# Patient Record
Sex: FEMALE | Race: OTHER | Employment: UNEMPLOYED | URBAN - METROPOLITAN AREA
[De-identification: names, ages, dates, MRNs, and addresses within clinical notes are randomized per-mention and may not be internally consistent; named-entity substitution may affect disease eponyms.]

---
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
## 2019-01-01 ENCOUNTER — HOSPITAL ENCOUNTER (EMERGENCY)
Age: 19
Discharge: HOME | End: 2019-01-01
Attending: EMERGENCY MEDICINE
Payer: COMMERCIAL

## 2019-01-01 VITALS
TEMPERATURE: 99 F | RESPIRATION RATE: 16 BRPM | HEART RATE: 100 BPM | WEIGHT: 170 LBS | OXYGEN SATURATION: 100 % | HEIGHT: 61 IN | DIASTOLIC BLOOD PRESSURE: 71 MMHG | SYSTOLIC BLOOD PRESSURE: 118 MMHG | BODY MASS INDEX: 32.1 KG/M2

## 2019-01-01 DIAGNOSIS — J02.9 SORE THROAT: Primary | ICD-10-CM

## 2019-01-01 LAB — DEPRECATED S PYO AG THROAT QL EIA: NEGATIVE

## 2019-01-01 PROCEDURE — 87081 CULTURE SCREEN ONLY: CPT

## 2019-01-01 PROCEDURE — 99283 EMERGENCY DEPT VISIT LOW MDM: CPT | Performed by: PHYSICIAN ASSISTANT

## 2019-01-01 PROCEDURE — 87880 STREP A ASSAY W/OPTIC: CPT

## 2019-01-01 RX ORDER — CEPHALEXIN 500 MG/1
500 CAPSULE ORAL 4 TIMES DAILY
Qty: 28 CAP | Refills: 0 | Status: SHIPPED | OUTPATIENT
Start: 2019-01-01 | End: 2019-01-08

## 2019-01-01 NOTE — ED NOTES
I have reviewed discharge instructions with the patient. The patient verbalized understanding. Patient left ED via Discharge Method: ambulatory to [facility] for questions and clarification provided. Patient given 1 scripts. To continue your aftercare when you leave the hospital, you may receive an automated call from our care team to check in on how you are doing. This is a free service and part of our promise to provide the best care and service to meet your aftercare needs.  If you have questions, or wish to unsubscribe from this service please call [phone]. Thank you for Choosing our [facility] Emergency Department.

## 2019-01-01 NOTE — ED PROVIDER NOTES
Pt 33 weeks pregnant sore throat started yesterday, also cough and congestion, no abd pain, bleeding or discharge The history is provided by the patient. Sore Throat This is a new problem. The current episode started yesterday. The problem has been rapidly worsening. Patient reports a subjective fever - was not measured. The fever has been present for 1 - 2 days. Associated symptoms include trouble swallowing and cough. She has had no exposure to strep or mono. She has tried nothing for the symptoms. The treatment provided no relief. Past Medical History:  
Diagnosis Date  Abscess  Acute bacterial conjunctivitis of both eyes  Acute sinusitis  Contact dermatitis  Dermatitis  Eczema  Exudative tonsillitis  Eye pain   
 right  Foul smelling urine  Gastroenteritis  Hordeolum external   
 Nausea and/or vomiting  Overeating  Overweight peds (BMI 85-94.9 percentile)  Pharyngitis  Rhinitis, allergic  Strep pharyngitis  Throat pain  URI (upper respiratory infection)  Vaginal discharge  Viral syndrome  Visual loss  Vitamin D deficiency History reviewed. No pertinent surgical history. Family History:  
Problem Relation Age of Onset  Hypertension Maternal Grandmother  Diabetes Maternal Grandfather Social History Socioeconomic History  Marital status: SINGLE Spouse name: Not on file  Number of children: Not on file  Years of education: Not on file  Highest education level: Not on file Social Needs  Financial resource strain: Not on file  Food insecurity - worry: Not on file  Food insecurity - inability: Not on file  Transportation needs - medical: Not on file  Transportation needs - non-medical: Not on file Occupational History  Not on file Tobacco Use  Smoking status: Never Smoker  Smokeless tobacco: Never Used Substance and Sexual Activity  Alcohol use: No  
 Drug use: No  
 Sexual activity: Not on file Other Topics Concern  Not on file Social History Narrative  Not on file ALLERGIES: Patient has no known allergies. Review of Systems HENT: Positive for sore throat and trouble swallowing. Respiratory: Positive for cough. All other systems reviewed and are negative. Vitals:  
 01/01/19 1145 BP: 116/76 Pulse: (!) 109 Resp: 16 Temp: 99.1 °F (37.3 °C) SpO2: 98% Weight: 77.1 kg (170 lb) Height: 5' 1\" (1.549 m) Physical Exam  
Constitutional: She is oriented to person, place, and time. She appears well-developed and well-nourished. No distress. HENT:  
Head: Normocephalic and atraumatic. Right Ear: Tympanic membrane normal.  
Left Ear: Tympanic membrane normal.  
Mouth/Throat: Tonsils are 1+ on the right. Tonsils are 1+ on the left. No tonsillar exudate. Nasal congestion Eyes: EOM are normal. Pupils are equal, round, and reactive to light. Neck: Normal range of motion. Neck supple. Cardiovascular: Normal rate, regular rhythm and normal heart sounds. Pulmonary/Chest: Effort normal and breath sounds normal. She has no wheezes. She has no rales. Abdominal: Soft. Bowel sounds are normal. There is no tenderness. Musculoskeletal: Normal range of motion. Neurological: She is alert and oriented to person, place, and time. Skin: Skin is warm. She is not diaphoretic. Psychiatric: She has a normal mood and affect. Nursing note and vitals reviewed. MDM Number of Diagnoses or Management Options Diagnosis management comments: rss -, will treat with keflex Amount and/or Complexity of Data Reviewed Clinical lab tests: ordered and reviewed Review and summarize past medical records: yes Risk of Complications, Morbidity, and/or Mortality Presenting problems: low Diagnostic procedures: low Management options: low Patient Progress Patient progress: improved Procedures

## 2019-01-04 LAB
BACTERIA SPEC CULT: NORMAL
SERVICE CMNT-IMP: NORMAL

## 2019-01-26 ENCOUNTER — HOSPITAL ENCOUNTER (OUTPATIENT)
Age: 19
Discharge: HOME | End: 2019-01-26
Attending: OBSTETRICS & GYNECOLOGY | Admitting: OBSTETRICS & GYNECOLOGY
Payer: COMMERCIAL

## 2019-01-26 VITALS
SYSTOLIC BLOOD PRESSURE: 109 MMHG | TEMPERATURE: 97.3 F | HEIGHT: 61 IN | BODY MASS INDEX: 32.12 KG/M2 | DIASTOLIC BLOOD PRESSURE: 70 MMHG | HEART RATE: 104 BPM

## 2019-01-26 PROBLEM — O36.8190 DECREASED FETAL MOVEMENT AFFECTING MANAGEMENT OF MOTHER, ANTEPARTUM: Status: ACTIVE | Noted: 2019-01-26

## 2019-01-26 PROCEDURE — 99282 EMERGENCY DEPT VISIT SF MDM: CPT

## 2019-01-26 PROCEDURE — 59025 FETAL NON-STRESS TEST: CPT

## 2019-01-26 PROCEDURE — 75810000275 HC EMERGENCY DEPT VISIT NO LEVEL OF CARE: Performed by: EMERGENCY MEDICINE

## 2019-01-26 NOTE — DISCHARGE INSTRUCTIONS
Counting Your Baby's Kicks: Care Instructions  Your Care Instructions    Counting your baby's kicks is one way your doctor can tell that your baby is healthy. Most women--especially in a first pregnancy--feel their baby move for the first time between 16 and 22 weeks. The movement may feel like flutters rather than kicks. Your baby may move more at certain times of the day. When you are active, you may notice less kicking than when you are resting. At your prenatal visits, your doctor will ask whether the baby is active. In your last trimester, your doctor may ask you to count the number of times you feel your baby move. Follow-up care is a key part of your treatment and safety. Be sure to make and go to all appointments, and call your doctor if you are having problems. It's also a good idea to know your test results and keep a list of the medicines you take. How do you count fetal kicks? · A common method of checking your baby's movement is to count the number of kicks or moves you feel in 1 hour. Ten movements (such as kicks, flutters, or rolls) in 1 hour are normal. Some doctors suggest that you count in the morning until you get to 10 movements. Then you can quit for that day and start again the next day. · Pick your baby's most active time of day to count. This may be any time from morning to evening. · If you do not feel 10 movements in an hour, your baby may be sleeping. Wait for the next hour and count again. When should you call for help? Call your doctor now or seek immediate medical care if:    · You noticed that your baby has stopped moving or is moving much less than normal.    Watch closely for changes in your health, and be sure to contact your doctor if you have any problems. Where can you learn more? Go to http://anh-praneeth.info/. Enter Y200 in the search box to learn more about \"Counting Your Baby's Kicks: Care Instructions. \"  Current as of: September 5, 2018  Content Version: 11.9  © 4599-1710 [email or web address]. Care instructions adapted under license by [employer] (which disclaims liability or warranty for this information). If you have questions about a medical condition or this instruction, always ask your healthcare professional. [address] any warranty or liability for your use of this information.

## 2019-01-26 NOTE — PROGRESS NOTES
Pt given discharge instructions- all questions answered and pt verbalizes understanding of instructions. Pt ambulated off unit in stable condition accompanied by FOB and mother.

## 2019-01-26 NOTE — ED PROVIDER NOTES
Chief Complaint: 
 
 
22 y.o. female at 37w1d 
weeks gestation who is seen for decreased fetal movement. Seen recently by MFM, was told baby was small, advised to do movement counts. Has not been moving well today. Care at [facility], not regularly seen. HISTORY: 
 
Social History Substance and Sexual Activity Sexual Activity Not on file Patient's last menstrual period was 03/04/2018. Social History Socioeconomic History  Marital status: SINGLE Spouse name: Not on file  Number of children: Not on file  Years of education: Not on file  Highest education level: Not on file Social Needs  Financial resource strain: Not on file  Food insecurity - worry: Not on file  Food insecurity - inability: Not on file  Transportation needs - medical: Not on file  Transportation needs - non-medical: Not on file Occupational History  Not on file Tobacco Use  Smoking status: Never Smoker  Smokeless tobacco: Never Used Substance and Sexual Activity  Alcohol use: No  
 Drug use: No  
 Sexual activity: Not on file Other Topics Concern [address] Service Not Asked  Blood Transfusions Not Asked  Caffeine Concern Not Asked  Occupational Exposure Not Asked [employer] Hazards Not Asked  Sleep Concern Not Asked  Stress Concern Not Asked  Weight Concern Not Asked  Special Diet Not Asked  Back Care Not Asked  Exercise Not Asked  Bike Helmet Not Asked  Seat Belt Not Asked  Self-Exams Not Asked Social History Narrative  Not on file Past Surgical History:  
Procedure Laterality Date  HX HERNIA REPAIR  2006 Past Medical History:  
Diagnosis Date  Abscess  Acute bacterial conjunctivitis of both eyes  Acute sinusitis  Contact dermatitis  Dermatitis  Eczema  Exudative tonsillitis  Eye pain   
 right  Foul smelling urine  Gastroenteritis  Gestational diabetes   
 failed 1 hr GTT  Hordeolum external   
 Nausea and/or vomiting  Overeating  Overweight peds (BMI 85-94.9 percentile)  Pharyngitis  Rhinitis, allergic  Strep pharyngitis  Throat pain  URI (upper respiratory infection)  Vaginal discharge  Viral syndrome  Visual loss  Vitamin D deficiency ROS: 
A 12 point review of symptoms negative except for chief complaint as described above. PHYSICAL EXAM: 
Blood pressure 109/70, pulse (!) 104, temperature 97.3 °F (36.3 °C), height 5' 1\" (1.549 m), last menstrual period 03/04/2018. Constitutional: The patient appears well, alert, oriented x 3. Cardiovascular: Heart RRR, no murmurs. Respiratory: Lungs clear, no respiratory distress GI: Abdomen soft, nontender, no guarding No fundal tenderness Musculoskeletal: no cva tenderness Upper ext: no edema, reflexes +2 Lower ext: no edema, neg kendall's, reflexes +2 Skin: no rashes or lesions Psychiatric:Mood/ Affect: appropriate Genitourinary: SVE: deferred FHT: 140's multiple accels, no decels, reactive NST. TOCO: no contractions I personally reviewed pt's medical record including relevant labs and ultrasounds Assessment/Plan: decreased fetal movement w reactive NST, stable for home, discussed movement counts. Understands indications for return, follow up in office as scheduled.

## 2019-01-26 NOTE — PROGRESS NOTES
Dr. Seble Cha notified of pt's arrival on unit and pt's complaint of decreased fetal movement. MD aware of category 1 FHT's. MD will come to bedside to evaluated shortly.

## 2019-05-20 PROBLEM — E66.01 SEVERE OBESITY (HCC): Status: ACTIVE | Noted: 2019-05-20

## 2021-09-10 PROBLEM — O36.8190 DECREASED FETAL MOVEMENT AFFECTING MANAGEMENT OF MOTHER, ANTEPARTUM: Status: RESOLVED | Noted: 2019-01-26 | Resolved: 2021-09-10

## 2021-09-10 PROBLEM — R10.9 RIGHT FLANK PAIN: Status: RESOLVED | Noted: 2017-12-01 | Resolved: 2021-09-10

## 2021-09-10 PROBLEM — Z34.90 PREGNANCY: Status: ACTIVE | Noted: 2021-09-10

## 2021-09-10 PROBLEM — Z86.32 HISTORY OF DIET-CONTROLLED GESTATIONAL DIABETES MELLITUS: Status: ACTIVE | Noted: 2021-09-10

## 2021-09-10 PROBLEM — Z09 ENCOUNTER FOR EXAMINATION FOLLOWING TREATMENT AT HOSPITAL: Status: RESOLVED | Noted: 2017-12-01 | Resolved: 2021-09-10

## 2021-09-10 PROBLEM — N39.0 URINARY TRACT INFECTION WITH HEMATURIA: Status: RESOLVED | Noted: 2017-12-01 | Resolved: 2021-09-10

## 2021-09-10 PROBLEM — E66.01 SEVERE OBESITY (HCC): Status: RESOLVED | Noted: 2019-05-20 | Resolved: 2021-09-10

## 2021-09-10 PROBLEM — R31.9 URINARY TRACT INFECTION WITH HEMATURIA: Status: RESOLVED | Noted: 2017-12-01 | Resolved: 2021-09-10

## 2021-09-10 PROBLEM — D72.829 LEUKOCYTOSIS: Status: RESOLVED | Noted: 2017-12-01 | Resolved: 2021-09-10

## 2021-09-21 ENCOUNTER — HOSPITAL ENCOUNTER (EMERGENCY)
Age: 21
Discharge: HOME | End: 2021-09-21
Attending: OBSTETRICS & GYNECOLOGY | Admitting: OBSTETRICS & GYNECOLOGY
Payer: COMMERCIAL

## 2021-09-21 VITALS
HEART RATE: 76 BPM | RESPIRATION RATE: 14 BRPM | TEMPERATURE: 98.5 F | OXYGEN SATURATION: 100 % | DIASTOLIC BLOOD PRESSURE: 58 MMHG | SYSTOLIC BLOOD PRESSURE: 99 MMHG

## 2021-09-21 PROBLEM — U07.1 COVID-19: Status: ACTIVE | Noted: 2021-09-21

## 2021-09-21 PROBLEM — O21.9 NAUSEA AND VOMITING IN PREGNANCY PRIOR TO 22 WEEKS GESTATION: Status: ACTIVE | Noted: 2021-09-21

## 2021-09-21 LAB
GLUCOSE, GLUUPC: NEGATIVE
KETONES UR-MCNC: NORMAL MG/DL
PROT UR QL: NORMAL

## 2021-09-21 PROCEDURE — 96374 THER/PROPH/DIAG INJ IV PUSH: CPT

## 2021-09-21 PROCEDURE — 74011250636 HC RX REV CODE- 250/636: Performed by: OBSTETRICS & GYNECOLOGY

## 2021-09-21 PROCEDURE — 96365 THER/PROPH/DIAG IV INF INIT: CPT

## 2021-09-21 PROCEDURE — 99285 EMERGENCY DEPT VISIT HI MDM: CPT

## 2021-09-21 PROCEDURE — 74011000258 HC RX REV CODE- 258: Performed by: OBSTETRICS & GYNECOLOGY

## 2021-09-21 PROCEDURE — 74011000250 HC RX REV CODE- 250: Performed by: OBSTETRICS & GYNECOLOGY

## 2021-09-21 PROCEDURE — 81002 URINALYSIS NONAUTO W/O SCOPE: CPT | Performed by: OBSTETRICS & GYNECOLOGY

## 2021-09-21 PROCEDURE — 74011000636 HC RX REV CODE- 636: Performed by: OBSTETRICS & GYNECOLOGY

## 2021-09-21 PROCEDURE — 96375 TX/PRO/DX INJ NEW DRUG ADDON: CPT

## 2021-09-21 RX ORDER — SODIUM CHLORIDE 9 MG/ML
125 INJECTION, SOLUTION INTRAVENOUS CONTINUOUS
Status: DISCONTINUED | OUTPATIENT
Start: 2021-09-21 | End: 2021-09-21 | Stop reason: HOSPADM

## 2021-09-21 RX ADMIN — FAMOTIDINE 20 MG: 10 INJECTION, SOLUTION INTRAVENOUS at 18:47

## 2021-09-21 RX ADMIN — SODIUM CHLORIDE 125 ML/HR: 900 INJECTION, SOLUTION INTRAVENOUS at 17:50

## 2021-09-21 RX ADMIN — CASIRIVIMAB AND IMDEVIMAB: 600; 600 INJECTION, SOLUTION, CONCENTRATE INTRAVENOUS at 19:05

## 2021-09-21 RX ADMIN — PROMETHAZINE HYDROCHLORIDE 12.5 MG: 25 INJECTION INTRAMUSCULAR; INTRAVENOUS at 18:50

## 2021-09-21 NOTE — PROGRESS NOTES
Dr Mady Collins at bs, consent given for treatment    2030 treatment/1 hr monitoring complete, pt states she feels better, d/c instructions given c pt verb understanding, left ambulatory to wait downstairs on her ride

## 2021-09-21 NOTE — H&P
[facility]INC[facility] History & Physical    Name: Elsa Cavanaugh MRN: [id number]  SSN: [id number]    YOB: 2000  Age: 24 y.o. Sex: female      Subjective:     Reason for Admission:  Pregnancy and COVID infection    History of Present Illness: Elsa Cavanaugh is a 24 y.o.   female with an estimated gestational age of 7w3d with Estimated Date of Delivery: 22. Patient complains of nausea and vomiting in the setting of COVID infection for 1 days. Pregnancy has been complicated by + COVID test on 21. Her sx started on 9/15. She and her  have both tested positive. She reports fever, cough, congestion, body aches/chills. Patient denies chest pain and shortness of breath. She denies any abdominal pain. She has noted N/V today 3-4 times. She has not been able to tolerate solid food today.     OB History    Para Term  AB Living   2 1 1     1   SAB TAB Ectopic Molar Multiple Live Births             1      # Outcome Date GA Lbr Ze/2nd Weight Sex Delivery Anes PTL Lv   2 Current            1 Term 19 38w4d  2.892 kg M Vag-Spont EPI  FORD     Past Medical History:   Diagnosis Date    Abscess     Acute bacterial conjunctivitis of both eyes     Acute sinusitis     Contact dermatitis     Dermatitis     Eczema     Exudative tonsillitis     Eye pain     right    Foul smelling urine     Gastroenteritis     Gestational diabetes     failed 1 hr GTT     Hordeolum external     Nausea and/or vomiting     Overeating     Overweight peds (BMI 85-94.9 percentile)     Pharyngitis     Rhinitis, allergic     Strep pharyngitis     Throat pain     URI (upper respiratory infection)     Vaginal discharge     Viral syndrome     Visual loss     Vitamin D deficiency      Past Surgical History:   Procedure Laterality Date    HX HERNIA REPAIR       Social History     Occupational History    Not on file   Tobacco Use    Smoking status: Never Smoker    Smokeless tobacco: Never Used   Substance and Sexual Activity    Alcohol use: No    Drug use: No    Sexual activity: Yes     Partners: Male     Birth control/protection: None     Family History   Problem Relation Age of Onset    Hypertension Maternal Grandmother     Diabetes Maternal Grandfather     Breast Cancer Paternal Grandmother        No Known Allergies  Prior to Admission medications    Medication Sig Start Date End Date Taking? Authorizing Provider   PNV66-Iron Fumarate-FA-DSS-DHA 26-1.2- mg cap Take  by mouth. Yes Provider, Historical   ondansetron hcl (ZOFRAN) 8 mg tablet Take 1 Tablet by mouth every eight (8) hours as needed for Nausea or Vomiting. Patient not taking: Reported on 2021 9/15/21   Micheline Tai NP        Review of Systems   Constitutional: Negative. HENT: Negative. Eyes: Negative. Respiratory: Negative. Cardiovascular: Negative. Gastrointestinal: negative  Genitourinary: Negative. Musculoskeletal: Negative. Skin: Negative. Neurological: Negative. Endo/Heme/Allergies: Negative. Psychiatric/Behavioral: Negative. Objective:     Vitals:    Vitals:    21 1848 21 1853 21 1854 21 1858   BP:   121/61    Pulse:   74    Resp:       Temp:       SpO2: 100% 100%  99%      Temp (24hrs), Av.5 °F (36.9 °C), Min:98.5 °F (36.9 °C), Max:98.5 °F (36.9 °C)    BP  Min: 109/69  Max: 121/61     Physical Exam  Constitutional: The patient appears well, alert, oriented x 3. Cardiovascular: Heart RRR, no murmurs.    Respiratory: Lungs clear, no respiratory distress  GI: Abdomen soft, nontender, no guarding  No fundal tenderness  Musculoskeletal: no cva tenderness  Upper ext: no edema, reflexes +2  Lower ext: no edema, neg kendall's, reflexes +2  Skin: no rashes or lesions  Psychiatric:Mood/ Affect: appropriate  Fetal Heart Rate: positive by doppler at bedside          I personally reviewed pt's medical record         Labs:   Recent Results (from the past 24 hour(s))   POC URINE DIPSTICK MANUAL    Collection Time: 09/21/21  6:20 PM   Result Value Ref Range    Protein (POC) Trace Negative    Glucose, urine (POC) Negative Negative    Ketones (POC) 15 mg/dL Negative     Labs & Vitals  Vitals Value   O2 device     FIO2     Temp 98.5 °F (36.9 °C)   Labs Value   Platelets Lab Results   Component Value Date/Time    PLATELET 748 54/35/1118 08:55 AM    Platelet cnt., External 240 09/10/2021 12:00 AM      Hemoglobin Lab Results   Component Value Date/Time    HGB 13.4 09/10/2021 08:55 AM    Hgb, External 13.4 09/10/2021 12:00 AM    Hgb, External 13.4 09/10/2021 12:00 AM      WBC Lab Results   Component Value Date/Time    WBC 5.9 09/10/2021 08:55 AM      Procalcitonin No results found for: PCT         Assessment and Plan: Active Problems:    COVID-19 (9/21/2021)      Nausea and vomiting in pregnancy prior to 22 weeks gestation (9/21/2021)       Recommendation made per MFM today for Regeneron (REGEN-COV). Risks, benefits, alternatives to medication reviewed in detail. Discussed benefits include: lessening symptoms of those individuals with SARS-Co-V-2 (the virus that causes COVID-19) and decreased risk of progressing to severe disease requiring hospitalization. Reviewed that REGEN-COV is an investigational medicine used in adults and adolescents who are at high risk for severe COVID-19, and that it is investigational because it is still being studied. Discussed the infusion will take 60 minutes or longer. Discussed possible side effects include allergic reactions during or after infusion. If side effects were to occur she is instructed to report them to her health provider immediately. After discussion she expressed understanding and desires to proceed with infusion. Patient observed    IVF rehydration, phenergan prn. Labs as noted. Lab values not availalble at time of discharge due to unable to locate sample. Patient discharged.

## 2021-11-30 PROBLEM — O98.511 COVID-19 AFFECTING PREGNANCY IN FIRST TRIMESTER: Status: ACTIVE | Noted: 2021-09-21

## 2021-11-30 PROBLEM — O99.212 OBESITY AFFECTING PREGNANCY IN SECOND TRIMESTER: Status: ACTIVE | Noted: 2021-11-30

## 2021-11-30 PROBLEM — O21.9 NAUSEA AND VOMITING IN PREGNANCY PRIOR TO 22 WEEKS GESTATION: Status: RESOLVED | Noted: 2021-09-21 | Resolved: 2021-11-30

## 2021-11-30 PROBLEM — O09.92 HIGH-RISK PREGNANCY IN SECOND TRIMESTER: Status: ACTIVE | Noted: 2021-09-10

## 2022-03-18 PROBLEM — O98.511 COVID-19 AFFECTING PREGNANCY IN FIRST TRIMESTER: Status: ACTIVE | Noted: 2021-09-21

## 2022-03-18 PROBLEM — U07.1 COVID-19 AFFECTING PREGNANCY IN FIRST TRIMESTER: Status: ACTIVE | Noted: 2021-09-21

## 2022-03-18 PROBLEM — O99.212 OBESITY AFFECTING PREGNANCY IN SECOND TRIMESTER: Status: ACTIVE | Noted: 2021-11-30

## 2022-03-19 PROBLEM — Z86.32 HISTORY OF DIET-CONTROLLED GESTATIONAL DIABETES MELLITUS: Status: ACTIVE | Noted: 2021-09-10

## 2022-03-20 PROBLEM — O09.92 HIGH-RISK PREGNANCY IN SECOND TRIMESTER: Status: ACTIVE | Noted: 2021-09-10

## 2022-03-28 ENCOUNTER — HOSPITAL ENCOUNTER (OUTPATIENT)
Age: 22
Discharge: HOME | End: 2022-03-28
Attending: OBSTETRICS & GYNECOLOGY | Admitting: OBSTETRICS & GYNECOLOGY
Payer: COMMERCIAL

## 2022-03-28 VITALS
DIASTOLIC BLOOD PRESSURE: 78 MMHG | OXYGEN SATURATION: 98 % | RESPIRATION RATE: 18 BRPM | WEIGHT: 199 LBS | SYSTOLIC BLOOD PRESSURE: 125 MMHG | HEART RATE: 102 BPM | BODY MASS INDEX: 37.57 KG/M2 | TEMPERATURE: 97.8 F | HEIGHT: 61 IN

## 2022-03-28 PROBLEM — R10.9 ABDOMINAL PAIN DURING PREGNANCY IN THIRD TRIMESTER: Status: ACTIVE | Noted: 2022-03-28

## 2022-03-28 PROBLEM — O26.893 ABDOMINAL PAIN DURING PREGNANCY IN THIRD TRIMESTER: Status: ACTIVE | Noted: 2022-03-28

## 2022-03-28 LAB
A1 MICROGLOB PLACENTAL VAG QL: NEGATIVE
CONTROL LINE PRESENT?: NORMAL
EXPIRATION DATE: NORMAL
GLUCOSE, GLUUPC: NEGATIVE
INTERNAL NEGATIVE CONTROL: NORMAL
KETONES UR-MCNC: NEGATIVE MG/DL
KIT LOT NO.: NORMAL
PROT UR QL: NORMAL

## 2022-03-28 PROCEDURE — 99285 EMERGENCY DEPT VISIT HI MDM: CPT

## 2022-03-28 PROCEDURE — 81002 URINALYSIS NONAUTO W/O SCOPE: CPT | Performed by: OBSTETRICS & GYNECOLOGY

## 2022-03-28 PROCEDURE — 74011250637 HC RX REV CODE- 250/637: Performed by: OBSTETRICS & GYNECOLOGY

## 2022-03-28 PROCEDURE — 84112 EVAL AMNIOTIC FLUID PROTEIN: CPT | Performed by: OBSTETRICS & GYNECOLOGY

## 2022-03-28 RX ORDER — OXYCODONE HYDROCHLORIDE 5 MG/1
5 TABLET ORAL
Status: COMPLETED | OUTPATIENT
Start: 2022-03-28 | End: 2022-03-28

## 2022-03-28 RX ORDER — PROMETHAZINE HYDROCHLORIDE 25 MG/1
25 TABLET ORAL
Status: COMPLETED | OUTPATIENT
Start: 2022-03-28 | End: 2022-03-28

## 2022-03-28 RX ADMIN — OXYCODONE 5 MG: 5 TABLET ORAL at 01:35

## 2022-03-28 RX ADMIN — PROMETHAZINE HYDROCHLORIDE 25 MG: 25 TABLET ORAL at 01:35

## 2022-03-28 NOTE — H&P
Chief Complaint   Patient presents with    Contractions       24 y.o. female  at 38w0d  weeks gestation who requests evaluation for possible labor. States she has been having nonstop pain since 5, then on further questioning states pain every 5-10 minutes. Thought her water might have broken as she is having some discharge. No vb. States ha not relieved with tylenol earlier and feels nauseated.      Her pregnancy issues include: none    Fetal movement has beennormal .    HISTORY:  OB History    Para Term  AB Living   2 1 1     1   SAB IAB Ectopic Molar Multiple Live Births             1      # Outcome Date GA Lbr Ze/2nd Weight Sex Delivery Anes PTL Lv   2 Current            1 Term 19 38w4d  2.892 kg M Vag-Spont EPI  [facility]       Past Surgical History:   Procedure Laterality Date    HX HERNIA REPAIR         Past Medical History:   Diagnosis Date    Contact dermatitis     Eczema     Rhinitis, allergic     Strep pharyngitis     Vitamin D deficiency        No Known Allergies    Family History   Problem Relation Age of Onset    Hypertension Maternal Grandmother     Diabetes Maternal Grandfather     Breast Cancer Paternal Grandmother        Social History     Socioeconomic History    Marital status: SINGLE     Spouse name: Not on file    Number of children: Not on file    Years of education: Not on file    Highest education level: Not on file   Occupational History    Not on file   Tobacco Use    Smoking status: Never Smoker    Smokeless tobacco: Never Used   Substance and Sexual Activity    Alcohol use: No    Drug use: No    Sexual activity: Yes     Partners: Male     Birth control/protection: None   Other Topics Concern     Service Not Asked    Blood Transfusions Not Asked    Caffeine Concern Not Asked    Occupational Exposure Not Asked    Hobby Hazards Not Asked    Sleep Concern Not Asked    Stress Concern Not Asked    Weight Concern Not Asked    Special Diet Not Asked    Back Care Not Asked    Exercise Not Asked    Bike Helmet Not Asked   [address] Not Asked    Self-Exams Not Asked   Social History Narrative    Not on file     Social Determinants of Health     Financial Resource Strain:     Difficulty of Paying Living Expenses: Not on file   Food Insecurity:     Worried About Running Out of Food in the Last Year: Not on file    Debora of Food in the Last Year: Not on file   Transportation Needs:     Lack of Transportation (Medical): Not on file    Lack of Transportation (Non-Medical): Not on file   Physical Activity:     Days of Exercise per Week: Not on file    Minutes of Exercise per Session: Not on file   Stress:     Feeling of Stress : Not on file   Social Connections:     Frequency of Communication with Friends and Family: Not on file    Frequency of Social Gatherings with Friends and Family: Not on file    Attends Evangelical Services: Not on file    Active Member of [address] [place] [address] or Organizations: Not on file    Attends Club or Organization Meetings: Not on file    Marital Status: Not on file   Intimate Partner Violence:     Fear of Current or Ex-Partner: Not on file    Emotionally Abused: Not on file    Physically Abused: Not on file    Sexually Abused: Not on file   Housing Stability:     Unable to Pay for Housing in the Last Year: Not on file    Number of [place] in the Last Year: Not on file    Unstable Housing in the Last Year: Not on file       ROS:  Negative:   negative 10 point ROS except as noted in HPI    Positive:   per hpi    PHYSICAL EXAM:  Blood pressure 125/78, pulse (!) 102, temperature 97.8 °F (36.6 °C), resp. rate 18, height 5' 1\" (1.549 m), weight 90.3 kg (199 lb), last menstrual period 07/10/2021, SpO2 98 %, not currently breastfeeding. The patient appears well, alert, oriented x 3. Appropriate affect. Lungs are clear. Heart RRR, no murmurs.    Abdomen soft, non-tender, no rebound/guarding, normoactive bs.  Fundus soft and non tender  Skin warm, dry, no rashes  Ext tr edema, DTR's normal    Cervix: 3 cm dilated    40% effaced    -1 station    Presenting Part: cephalic    Fetal Heart Rate: Reactive  Baseline: 125 per minute  Variability: moderate  Accelerations: yes  Decelerations: none  Uterine contractions: none   I personally reviewed and interpreted the FHR tracing      Tests performed and reviwed:   UA:tr protein   amnisure neg    I have personally reviewed the patient's history, prenatal record, and pertinent test results. vital sign trends, laboratory results, previous provider notes support my clinical impression. Assessment:  24 y.o. female at 38w0d  no srom, not in labor  Reassuring fetal status    Plan:  Findings and test results were discussed. Oxycodone and phenergan for ha and nausea. Labor s/s reviewed.   Time spent with patient consistent with level of care    Signed By:  Isaak Gimenez MD     March 28, 2022

## 2022-03-28 NOTE — DISCHARGE INSTRUCTIONS
Patient Education   Patient Education        Preparing for Childbirth: Care Instructions  Overview     You are getting close to the birth of your child. For months, you've been taking care of yourself and the baby. Now you can still take steps that will help you have a healthy labor and birth. You can take classes to help prepare for the birth. You also can talk with your doctor about what you would like to happen during your labor. Changes happen in the last 2 months of your pregnancy. Your baby becomes too big to move around easily inside the uterus and may seem to move less. At the end of your pregnancy, your baby probably will settle into a head-down position. You will likely feel some pressure in your pelvis as you get close to the birth. You may notice times when your belly tightens and becomes firm to the touch, then relaxes. These are called [place] contractions. They sometimes occur as often as every 10 to 20 minutes. These contractions usually stop when you are active. (True labor pains continue or increase if you move around.)  Rupture of your membranes (\"breaking of the water\") often is a sign that labor has started or is about to start. This happens when a hole or tear develops in the fluid-filled bag (amniotic sac) that surrounds and protects your baby. You may feel a huge gush of water or a steady trickle of fluid. Call your doctor or go to the hospital if you think this has happened. Contractions may start, or if you are already having contractions, they may get stronger. Follow-up care is a key part of your treatment and safety. Be sure to make and go to all appointments, and call your doctor if you are having problems. It's also a good idea to know your test results and keep a list of the medicines you take. How can you care for yourself at home? · Get plenty of rest.  · Take childbirth classes with your partner or . You will learn relaxation exercises that are helpful during labor. You also will learn what you can do to manage labor pain. · If you have other children, take a class to learn how to help them adjust to the new baby. · Develop a written birth plan, if you choose to, keeping in mind that labor is hard to predict and your plan may change after labor begins. Some of what a birth plan may address includes:  ? Where you would like to have your baby. This includes the building and the room. It could be the hospital's birthing room, a separate birthing center, or your own home. ? Who you would like to assist with delivery of your baby. You may want your doctor, an obstetrician, or a certified nurse-midwife. Some women prefer a lay midwife or a  to provide support before and after delivery. ? Whether you want a , nurse, midwife, or  to give you support from early labor until after childbirth. ? What comfort measures you want. You may have to choose between walking around during labor or having the security of a heart monitor for your baby. You may want to listen to music during labor. You may know what position you want to be in (such as sitting, squatting, or reclining) for pushing. ? What pain relief you would like. There are several choices, so be sure to talk with your doctor about them. ? How you want you and your baby to spend the first few hours after birth. § You may want to keep your baby with you for at least 1 hour after birth for bonding and early breastfeeding. § You may want the hospital to delay some infant care steps, such as a vitamin K injection, so that you have calming time with your baby. § You may not want visitors, or you may want other family members there. · Know the early signs of labor, such as a steady ache low in your back. · If you are going to a hospital or clinic to have your baby, have your bag ready to go. ? Pack a nightgown, robe, panties, socks, and slippers.  You may want to bring your own soap, shampoo, brush, toothbrush, and toothpaste. Bring your own self-adhesive sanitary pads. ? In your baby's bag, bring an outfit, small blanket, and diapers. Have your car seat ready to go. When should you call for help? Watch closely for changes in your health, and be sure to contact your doctor if you have any questions about preparing for childbirth. Where can you learn more? Go to http://www.gray.com/  Enter [id number] in the search box to learn more about \"Preparing for Childbirth: Care Instructions. \"  Current as of: 2021               Content Version: 13.2  © 2837-0640 [employer]. Care instructions adapted under license by [employer] (which disclaims liability or warranty for this information). If you have questions about a medical condition or this instruction, always ask your healthcare professional. [place] [address] any warranty or liability for your use of this information. Week 38 of Your Pregnancy: Care Instructions  Overview     Believe it or not, your baby is almost here. You may have ideas about your baby's personality because of how much your baby moves. Or you may have noticed how your baby responds to sounds, warmth, cold, and light. You may even know what kind of music your baby likes. By now, you have a better idea of what to expect during delivery. You may have talked about your birth preferences with your doctor. But even if you want a vaginal birth, it's a good idea to learn about  births.  birth means that your baby is born through a cut (incision) in your lower belly. In some cases it may be the best choice for the health of you and your baby. Follow-up care is a key part of your treatment and safety. Be sure to make and go to all appointments, and call your doctor if you are having problems. It's also a good idea to know your test results and keep a list of the medicines you take.   How can you care for yourself at home? Learn about  birth  · Most C-sections are unplanned. They are done because of problems that occur during labor. These problems might include:  ? Labor that slows or stops. ? High blood pressure or other problems for you.  ? Signs of distress in your baby. These signs may include a very fast or slow heart rate. · Although you and your baby are likely do well after a , it is major surgery. It has more risks than a vaginal delivery. · In some cases, a planned  may be safer than a vaginal delivery. This may be the case if:  ? You have a health problem, such as a heart condition. ? Your baby isn't in a head-down position for delivery. This is called a breech position. ? The uterus has scars from past surgeries. This could increase the chance of a tear in the uterus. ? There is a problem with the placenta.  ? You have an infection, such as genital herpes, that could be spread to your baby. ? You are having twins or more. ? Your baby weighs 9 to 10 pounds or more. · Because of the risks of a , planned C-sections generally should be done only for medical reasons. And a planned  should be done at 39 weeks or later unless there is a medical reason to do it sooner. Know what to expect after delivery, and plan for the first few weeks at home  · You, your baby, and your partner or  will get identification bands. Only people with matching bands can  the baby from the nursery. · You will learn how to feed, diaper, and bathe your baby. And you will learn how to care for the umbilical cord stump. If your baby will be circumcised, you will also learn how to care for that. · Ask people to wait to visit you until you are at home. And ask them to wash their hands before they touch your baby. · Make sure you have another adult in your home for at least 2 or 3 days after the birth.   · During the first 2 weeks, limit when friends and family can visit.  · Do not allow visitors who have colds or infections. Make sure all visitors are up to date with their vaccinations. Never let anyone smoke around your baby. · Try to nap when the baby naps. Be aware of postpartum depression  · \"Baby blues\" are common for the first 1 to 2 weeks after birth. You may cry or feel sad or irritable for no reason. · Sometimes these feelings last longer and are more intense. This is called postpartum depression. · If your symptoms last for more than a few weeks or you feel very depressed, ask your doctor for help. · Postpartum depression can be treated. Support groups and counseling can help. Sometimes medicine can also help. Where can you learn more? Go to http://www.gray.com/  Enter B044 in the search box to learn more about \"Week 38 of Your Pregnancy: Care Instructions. \"  Current as of: June 16, 2021               Content Version: 13.2  © 7825-6150 [employer]. Care instructions adapted under license by [employer] (which disclaims liability or warranty for this information). If you have questions about a medical condition or this instruction, always ask your healthcare professional. [place] [address] any warranty or liability for your use of this information.

## 2022-03-28 NOTE — PROGRESS NOTES
Pt to [facility] with contractions which she stated started around 5 pm. She also claims she thinks her water broke. Amnisure obtained. EFM and [facility] on.

## 2022-03-29 ENCOUNTER — HOSPITAL ENCOUNTER (EMERGENCY)
Age: 22
Discharge: HOME | End: 2022-03-29
Attending: OBSTETRICS & GYNECOLOGY | Admitting: OBSTETRICS & GYNECOLOGY
Payer: COMMERCIAL

## 2022-03-29 VITALS
SYSTOLIC BLOOD PRESSURE: 118 MMHG | HEART RATE: 100 BPM | DIASTOLIC BLOOD PRESSURE: 74 MMHG | RESPIRATION RATE: 18 BRPM | BODY MASS INDEX: 37.57 KG/M2 | WEIGHT: 199 LBS | HEIGHT: 61 IN | TEMPERATURE: 98.3 F | OXYGEN SATURATION: 96 %

## 2022-03-29 PROBLEM — N89.8 VAGINAL DISCHARGE DURING PREGNANCY IN THIRD TRIMESTER: Status: ACTIVE | Noted: 2022-03-29

## 2022-03-29 PROBLEM — O26.893 HEADACHE IN PREGNANCY, ANTEPARTUM, THIRD TRIMESTER: Status: ACTIVE | Noted: 2022-03-29

## 2022-03-29 PROBLEM — R51.9 HEADACHE IN PREGNANCY, ANTEPARTUM, THIRD TRIMESTER: Status: ACTIVE | Noted: 2022-03-29

## 2022-03-29 PROBLEM — O26.893 VAGINAL DISCHARGE DURING PREGNANCY IN THIRD TRIMESTER: Status: ACTIVE | Noted: 2022-03-29

## 2022-03-29 LAB
SERVICE CMNT-IMP: NORMAL
WET PREP GENITAL: NORMAL
WET PREP GENITAL: NORMAL

## 2022-03-29 PROCEDURE — 99283 EMERGENCY DEPT VISIT LOW MDM: CPT

## 2022-03-29 PROCEDURE — 74011250637 HC RX REV CODE- 250/637: Performed by: OBSTETRICS & GYNECOLOGY

## 2022-03-29 PROCEDURE — 87210 SMEAR WET MOUNT SALINE/INK: CPT

## 2022-03-29 RX ORDER — METOCLOPRAMIDE HYDROCHLORIDE 5 MG/ML
10 INJECTION INTRAMUSCULAR; INTRAVENOUS ONCE
Status: DISCONTINUED | OUTPATIENT
Start: 2022-03-29 | End: 2022-03-29

## 2022-03-29 RX ORDER — ACETAMINOPHEN 500 MG
1000 TABLET ORAL
Status: COMPLETED | OUTPATIENT
Start: 2022-03-29 | End: 2022-03-29

## 2022-03-29 RX ADMIN — ACETAMINOPHEN 1000 MG: 500 TABLET, FILM COATED ORAL at 14:31

## 2022-03-29 NOTE — DISCHARGE INSTRUCTIONS
Patient Education        Counting Your Baby's Kicks: Care Instructions  Overview     Counting your baby's kicks is one way your doctor can tell that your baby is healthy. Most women--especially in a first pregnancy--feel their baby move for the first time between 16 and 22 weeks. The movement may feel like flutters rather than kicks. Your baby may move more at certain times of the day. When you are active, you may notice less kicking than when you are resting. At your prenatal visits, your doctor will ask whether the baby is active. In your last trimester, your doctor may ask you to count the number of times you feel your baby move. Follow-up care is a key part of your treatment and safety. Be sure to make and go to all appointments, and call your doctor if you are having problems. It's also a good idea to know your test results and keep a list of the medicines you take. How do you count fetal kicks? · A common method of checking your baby's movement is to note the length of time it takes to count ten movements (such as kicks, flutters, or rolls). · Pick your baby's most active time of day to count. This may be any time from morning to evening. · If you don't feel 10 movements in an hour, have something to eat or drink and count for another hour. If you don't feel at least 10 movements in the 2-hour period, call your doctor. When should you call for help? Call your doctor now or seek immediate medical care if:    · You noticed that your baby has stopped moving or is moving much less than normal.   Watch closely for changes in your health, and be sure to contact your doctor if you have any problems. Where can you learn more? Go to http://www.gray.com/  Enter [id number] in the search box to learn more about \"Counting Your Baby's Kicks: Care Instructions. \"  Current as of: June 16, 2021               Content Version: 13.2  © 2465-2014 [employer].    Care instructions adapted under license by [employer] (which disclaims liability or warranty for this information). If you have questions about a medical condition or this instruction, always ask your healthcare professional. [address] any warranty or liability for your use of this information. Patient Education        Preeclampsia: Care Instructions  Overview     Preeclampsia occurs when a woman's blood pressure rises during pregnancy. Often with preeclampsia, you also have swelling in your legs, hands, and face. A test may show too much protein in your urine. If preeclampsia is severe and not treated, it can lead to seizures (eclampsia) and damage to your liver or kidneys. Preeclampsia can prevent your baby from getting enough food and oxygen. This can cause a low birth weight or other problems. Your doctor will watch you closely to prevent these problems. Your doctor also may recommend that you reduce your activity. If your preeclampsia is a danger to your health or the health of your baby, your doctor may need to deliver your baby early. While preeclampsia is a concern, most women with preeclampsia have healthy babies. After a woman gives birth, preeclampsia usually goes away on its own. But symptoms may last a few weeks or more and can get worse after delivery. Rarely, symptoms of preeclampsia don't show up until days or even weeks after childbirth. Follow-up care is a key part of your treatment and safety. Be sure to make and go to all appointments, and call your doctor if you are having problems. It's also a good idea to know your test results and keep a list of the medicines you take. How can you care for yourself at home? · Take and record your blood pressure at home if your doctor tells you to. ? Ask your doctor to check your blood pressure monitor to be sure that it is accurate and that the cuff fits you.  Also ask your doctor to watch you to make sure that you are using it right.  ? You should not eat, use tobacco products, or use medicine known to raise blood pressure (such as some nasal decongestant sprays) before you take your blood pressure. ? Avoid taking your blood pressure if you have just exercised. Also avoid taking it if you are nervous or upset. Rest at least 15 minutes before you take your blood pressure. · You may need to take medicine to manage your blood pressure. Take your medicines exactly as prescribed. Call your doctor if you think you are having a problem with your medicine. · Do not smoke. Quitting smoking will help improve your baby's growth and health. If you need help quitting, talk to your doctor about stop-smoking programs and medicines. These can increase your chances of quitting for good. · Eat a balanced and healthy diet that has lots of fruits and vegetables. · You can keep track of your baby's health by checking your baby's movement. A common method for this is to note the length of time it takes to count 10 movements (such as kicks, flutters, or rolls). Call your doctor if you don't feel at least 10 movements in a 2-hour period. Track your baby's movements once each day. Bring this record with you to each prenatal visit. When should you call for help? Share this information with your partner or a friend. They can help you watch for warning signs. Call 911  anytime you think you may need emergency care. For example, call if:    · You passed out (lost consciousness).     · You have a seizure. Call your doctor now or seek immediate medical care if:    · You have symptoms of preeclampsia, such as:  ? Sudden swelling of your face, hands, or feet. ? New vision problems (such as dimness, blurring, or seeing spots).   ? A severe headache.     · Your blood pressure is very high, such as 160/110 or higher.     · Your blood pressure is higher than your doctor told you it should be, or it rises quickly.     · You have new nausea or vomiting.     · You think that you are in labor.     · You have pain in your belly or pelvis. Watch closely for changes in your health, and be sure to contact your doctor if:    · You gain weight rapidly. Where can you learn more? Go to http://www.gray.com/  Enter Z954 in the search box to learn more about \"Preeclampsia: Care Instructions. \"  Current as of: 2021               Content Version: 13.2   [employer]. Care instructions adapted under license by [employer] (which disclaims liability or warranty for this information). If you have questions about a medical condition or this instruction, always ask your healthcare professional. [place] [address] any warranty or liability for your use of this information. Patient Education        Pregnancy Precautions: Care Instructions  Your Care Instructions     There is no sure way to prevent labor before your due date ( labor) or to prevent most other pregnancy problems. But there are things you can do to increase your chances of a healthy pregnancy. Go to your appointments, follow your doctor's advice, and take good care of yourself. Eat well, and exercise (if your doctor agrees). And make sure to drink plenty of water. Follow-up care is a key part of your treatment and safety. Be sure to make and go to all appointments, and call your doctor if you are having problems. It's also a good idea to know your test results and keep a list of the medicines you take. How can you care for yourself at home? · Make sure you go to your prenatal appointments. At each visit, your doctor will check your blood pressure. Your doctor will also check to see if you have protein in your urine. High blood pressure and protein in urine are signs of preeclampsia. This condition can be dangerous for you and your baby. · Drink plenty of fluids. Dehydration can cause contractions.  If you have kidney, heart, or liver disease and have to limit fluids, talk with your doctor before you increase the amount of fluids you drink. · Tell your doctor right away if you notice any symptoms of an infection, such as:  ? Burning when you urinate. ? A foul-smelling discharge from your vagina. ? Vaginal itching. ? Unexplained fever. ? Unusual pain or soreness in your uterus or lower belly. · Eat a balanced diet. Include plenty of foods that are high in calcium and iron. ? Foods high in calcium include milk, cheese, yogurt, almonds, and broccoli. ? Foods high in iron include red meat, shellfish, poultry, eggs, beans, raisins, whole-grain bread, and leafy green vegetables. · Do not smoke. If you need help quitting, talk to your doctor about stop-smoking programs and medicines. These can increase your chances of quitting for good. · Do not drink alcohol or use marijuana or illegal drugs. · Follow your doctor's directions about activity. Your doctor will let you know how much, if any, exercise you can do. · Ask your doctor if you can have sex. If you are at risk for early labor, your doctor may ask you to not have sex. · Take care to prevent falls. During pregnancy, your joints are loose, and your balance is off. Sports such as bicycling, skiing, or in-line skating can increase your risk of falling. And don't ride horses or motorcycles, dive, water ski, scuba dive, or parachute jump while you are pregnant. · Avoid things that can make your body too hot and may be harmful to your baby, such as a hot tub or sauna. Or talk with your doctor before doing anything that raises your body temperature. Your doctor can tell you if it's safe. · Do not take any over-the-counter or herbal medicines or supplements without talking to your doctor or pharmacist first.  When should you call for help? Call 911  anytime you think you may need emergency care.  For example, call if:    · You passed out (lost consciousness).     · You have a seizure.     · You have severe vaginal bleeding.     · You have severe pain in your belly or pelvis.     · You have had fluid gushing or leaking from your vagina and you know or think the umbilical cord is bulging into your vagina. If this happens, immediately get down on your knees so your rear end (buttocks) is higher than your head. This will decrease the pressure on the cord until help arrives. Call your doctor now or seek immediate medical care if:    · You have signs of preeclampsia, such as:  ? Sudden swelling of your face, hands, or feet. ? New vision problems (such as dimness, blurring, or seeing spots). ? A severe headache.     · You have any vaginal bleeding.     · You have belly pain or cramping.     · You have a fever.     · You have had regular contractions (with or without pain) for an hour. This means that you have 8 or more within 1 hour or 4 or more in 20 minutes after you change your position and drink fluids.     · You have a sudden release of fluid from your vagina.     · You have low back pain or pelvic pressure that does not go away.     · You notice that your baby has stopped moving or is moving much less than normal.   Watch closely for changes in your health, and be sure to contact your doctor if you have any problems. Where can you learn more? Go to http://www.[employer].com/  Enter Y951 in the search box to learn more about \"Pregnancy Precautions: Care Instructions. \"  Current as of: June 16, 2021               Content Version: 13.2  © 8644-9215 [email or web address]. Care instructions adapted under license by [employer] (which disclaims liability or warranty for this information). If you have questions about a medical condition or this instruction, always ask your healthcare professional. [place] [address] any warranty or liability for your use of this information.

## 2022-03-29 NOTE — PROGRESS NOTES
Pt d/c home per Dr. Solomon Cox order. Discharge instructions and education given on fetal kick counts, preeclampsia precautions, and pregnancy precautions. Pt verbalized understanding, denies any questions. Pt ambulated out of unit, declines need for wheelchair.

## 2022-03-29 NOTE — PROGRESS NOTES
Pt to room [facility] for triage, assessment begins, EFM and [place] applied and tracing well.

## 2022-03-29 NOTE — H&P
[facility] History & Physical    Name: Hellen Lawrence MRN: [id number]  SSN: [id number]    YOB: 2000  Age: 24 y.o. Sex: female      Subjective: 25 yo  at 38w1d presents with headache for past 2-3 days, nausea, vaginal discharge and irregular contractions. Reason for Admission:  Pregnancy and headache, contractions, vaginal discharge    History of Present Illness: Ms. Freddie Shukla is a 24 y.o.  female with an estimated gestational age of 43w4d with Estimated Date of Delivery: 22. Patient complains of frontal headache 10/10 with tension along front of head, vaginal discharge and irregular contractions for 2 days. Pregnancy has been complicated by obesity, hx of GDM with previous pregnancy, COVID 19 in first trimester. Patient denies chest pain, fever, pelvic pressure, right upper quadrant pain  , shortness of breath, swelling, vaginal bleeding , vaginal leaking of fluid  and visual disturbances. Patient was seen 3/28/22 for complaint of contractions, HA and vaginal discharge. SVE was 3/40/-1 per Dr. Anisa Bragg. She was given anti-emetic and percocet at that time with improvement in her sx. She endorses active fetus.     OB History    Para Term  AB Living   2 1 1     1   SAB IAB Ectopic Molar Multiple Live Births             1      # Outcome Date GA Lbr Ze/2nd Weight Sex Delivery Anes PTL Lv   2 Current            1 Term 19 38w4d  2.892 kg M Vag-Spont EPI  FORD     Past Medical History:   Diagnosis Date    Contact dermatitis     Disease of blood and blood forming organ     Eczema     Gestational diabetes     prior pregnancy    Rhinitis, allergic     Strep pharyngitis     Vitamin D deficiency      Past Surgical History:   Procedure Laterality Date    HX HERNIA REPAIR       Social History     Occupational History    Not on file   Tobacco Use    Smoking status: Never Smoker    Smokeless tobacco: Never Used   Vaping Use    Vaping Use: Never used Substance and Sexual Activity    Alcohol use: No    Drug use: No    Sexual activity: Yes     Partners: Male     Birth control/protection: None      Family History   Problem Relation Age of Onset    Hypertension Maternal Grandmother     Diabetes Maternal Grandfather     Breast Cancer Paternal Grandmother        No Known Allergies  Prior to Admission medications    Medication Sig Start Date End Date Taking? Authorizing Provider   PNV66-Iron Fumarate-FA-DSS-DHA 26-1.2- mg cap Take  by mouth. Yes Provider, Historical        Review of Systems:  A comprehensive review of systems was negative except for that written in the History of Present Illness. Objective:     Vitals:    Vitals:    22 1424 22 1425 22 1446 22 1455   BP:  119/72 114/69 118/74   Pulse:  (!) 108  100   Resp:       Temp:       SpO2: 96%      Weight:       Height:          Temp (24hrs), Av.3 °F (36.8 °C), Min:98.3 °F (36.8 °C), Max:98.3 °F (36.8 °C)    BP  Min: 114/69  Max: 134/74     Physical Exam:  Constitutional: The patient appears well, alert, oriented x 3. Cardiovascular: Heart RRR, no murmurs. Respiratory: Lungs clear, no respiratory distress  GI: Abdomen soft, nontender, no guarding  No fundal tenderness  Musculoskeletal: no cva tenderness  Upper ext: no edema, reflexes +2  Lower ext: no edema, neg kendall's, reflexes +2  Skin: no rashes or lesions  Psychiatric:Mood/ Affect: appropriate  Genitourinary: SVE: 3-8/69/-7 cephalic  Membranes:  Intact  Uterine Activity:  None  Fetal Heart Rate:  Reactive  Baseline: 140 beats per minute  Variability: moderate  Accelerations: yes  Decelerations: none  Uterine contractions: irregular, every 12 minutes       Lab/Data Review:  No results found for this or any previous visit (from the past 24 hour(s)). Assessment and Plan:      Active Problems:    Vaginal discharge during pregnancy in third trimester (3/29/2022)      Headache in pregnancy, antepartum, third trimester (3/29/2022)       After hydration and tylenol, patient reports HA is 3-4/10 on pain scale. She declines reglan. She requests d/c to home. Reviewed preeclampsia precautions in detail. Urine dip negative for protein. Fu wet mount results. Serial Bps all normal range. Last /74  FWB overall reassuring - category 1 tracing. Not in labor, not medical emergency. She has fu appt in 2-3 days, reviewed need for return to [facility] if HA persists or recurs, visual changes, RUQ pain or N/V or any VB, LOF/regular painful contractions or decreased FM. Patient expressed understanding. DC to home. Note sent to Dr. Zion Casarez.

## 2022-04-03 ENCOUNTER — HOSPITAL ENCOUNTER (OUTPATIENT)
Age: 22
Discharge: HOME | End: 2022-04-04
Attending: OBSTETRICS & GYNECOLOGY | Admitting: OBSTETRICS & GYNECOLOGY
Payer: COMMERCIAL

## 2022-04-03 VITALS
TEMPERATURE: 98 F | DIASTOLIC BLOOD PRESSURE: 63 MMHG | BODY MASS INDEX: 37.95 KG/M2 | SYSTOLIC BLOOD PRESSURE: 114 MMHG | HEART RATE: 107 BPM | RESPIRATION RATE: 16 BRPM | HEIGHT: 61 IN | WEIGHT: 201 LBS

## 2022-04-03 PROCEDURE — 81002 URINALYSIS NONAUTO W/O SCOPE: CPT | Performed by: OBSTETRICS & GYNECOLOGY

## 2022-04-03 PROCEDURE — 99282 EMERGENCY DEPT VISIT SF MDM: CPT

## 2022-04-03 PROCEDURE — 59025 FETAL NON-STRESS TEST: CPT

## 2022-04-04 LAB
GLUCOSE, GLUUPC: NEGATIVE
KETONES UR-MCNC: NEGATIVE MG/DL
PROT UR QL: NEGATIVE

## 2022-04-04 NOTE — DISCHARGE INSTRUCTIONS
Patient Education        Pregnancy Precautions: Care Instructions  Your Care Instructions     There is no sure way to prevent labor before your due date ( labor) or to prevent most other pregnancy problems. But there are things you can do to increase your chances of a healthy pregnancy. Go to your appointments, follow your doctor's advice, and take good care of yourself. Eat well, and exercise (if your doctor agrees). And make sure to drink plenty of water. Follow-up care is a key part of your treatment and safety. Be sure to make and go to all appointments, and call your doctor if you are having problems. It's also a good idea to know your test results and keep a list of the medicines you take. How can you care for yourself at home? · Make sure you go to your prenatal appointments. At each visit, your doctor will check your blood pressure. Your doctor will also check to see if you have protein in your urine. High blood pressure and protein in urine are signs of preeclampsia. This condition can be dangerous for you and your baby. · Drink plenty of fluids. Dehydration can cause contractions. If you have kidney, heart, or liver disease and have to limit fluids, talk with your doctor before you increase the amount of fluids you drink. · Tell your doctor right away if you notice any symptoms of an infection, such as:  ? Burning when you urinate. ? A foul-smelling discharge from your vagina. ? Vaginal itching. ? Unexplained fever. ? Unusual pain or soreness in your uterus or lower belly. · Eat a balanced diet. Include plenty of foods that are high in calcium and iron. ? Foods high in calcium include milk, cheese, yogurt, almonds, and broccoli. ? Foods high in iron include red meat, shellfish, poultry, eggs, beans, raisins, whole-grain bread, and leafy green vegetables. · Do not smoke. If you need help quitting, talk to your doctor about stop-smoking programs and medicines.  These can increase your chances of quitting for good. · Do not drink alcohol or use marijuana or illegal drugs. · Follow your doctor's directions about activity. Your doctor will let you know how much, if any, exercise you can do. · Ask your doctor if you can have sex. If you are at risk for early labor, your doctor may ask you to not have sex. · Take care to prevent falls. During pregnancy, your joints are loose, and your balance is off. Sports such as bicycling, skiing, or in-line skating can increase your risk of falling. And don't ride horses or motorcycles, dive, water ski, scuba dive, or parachute jump while you are pregnant. · Avoid things that can make your body too hot and may be harmful to your baby, such as a hot tub or sauna. Or talk with your doctor before doing anything that raises your body temperature. Your doctor can tell you if it's safe. · Do not take any over-the-counter or herbal medicines or supplements without talking to your doctor or pharmacist first.  When should you call for help? Call 911  anytime you think you may need emergency care. For example, call if:    · You passed out (lost consciousness).     · You have a seizure.     · You have severe vaginal bleeding.     · You have severe pain in your belly or pelvis.     · You have had fluid gushing or leaking from your vagina and you know or think the umbilical cord is bulging into your vagina. If this happens, immediately get down on your knees so your rear end (buttocks) is higher than your head. This will decrease the pressure on the cord until help arrives. Call your doctor now or seek immediate medical care if:    · You have signs of preeclampsia, such as:  ? Sudden swelling of your face, hands, or feet. ? New vision problems (such as dimness, blurring, or seeing spots).   ? A severe headache.     · You have any vaginal bleeding.     · You have belly pain or cramping.     · You have a fever.     · You have had regular contractions (with or without pain) for an hour. This means that you have 8 or more within 1 hour or 4 or more in 20 minutes after you change your position and drink fluids.     · You have a sudden release of fluid from your vagina.     · You have low back pain or pelvic pressure that does not go away.     · You notice that your baby has stopped moving or is moving much less than normal.   Watch closely for changes in your health, and be sure to contact your doctor if you have any problems. Where can you learn more? Go to http://www.nunes.com/  Enter Y951 in the search box to learn more about \"Pregnancy Precautions: Care Instructions. \"  Current as of: June 16, 2021               Content Version: 13.2  © 2006-2022 Healthwise, [employer]. Care instructions adapted under license by [employer] (which disclaims liability or warranty for this information). If you have questions about a medical condition or this instruction, always ask your healthcare professional. [address] any warranty or liability for your use of this information.

## 2022-04-04 NOTE — PROGRESS NOTES
Pt to [facility] with c/o ctx since 2200. Denies VB. Endorses good fetal movement. States [place] my water broke because it is wet down there but I have not tried  A pad yet. \" States she noticed the vaginal wetness \"sometime today. \" EFM placed on soft non tender abdomen.  Dr. Idania Bañuelos notified of arrival

## 2022-04-04 NOTE — H&P
History & Physical    Name: Elsa Cavanaugh MRN: [id number]  SSN: [id number]    YOB: 2000  Age: 24 y.o. Sex: female        Subjective: Ctxs  25 yo  at 38+6 wks presents with ctxs. She is unsure of frequency. She denies lof or vb. Her pregnancy has been c/b Covid in 1st trimester, h/o GDM passed this pregnancy, right flank pain and UTI in pregnancy. Estimated Date of Delivery: 22  OB History    Para Term  AB Living   2 1 1     1   SAB IAB Ectopic Molar Multiple Live Births             1      # Outcome Date GA Lbr Ze/2nd Weight Sex Delivery Anes PTL Lv   2 Current            1 Term 19 38w4d  2.892 kg M Vag-Spont EPI  FORD       Past Medical History:   Diagnosis Date    Contact dermatitis     Disease of blood and blood forming organ     Eczema     Gestational diabetes     prior pregnancy    Rhinitis, allergic     Strep pharyngitis     Vitamin D deficiency      Past Surgical History:   Procedure Laterality Date    HX HERNIA REPAIR       Social History     Occupational History    Not on file   Tobacco Use    Smoking status: Never Smoker    Smokeless tobacco: Never Used   Vaping Use    Vaping Use: Never used   Substance and Sexual Activity    Alcohol use: No    Drug use: No    Sexual activity: Yes     Partners: Male     Birth control/protection: None     Family History   Problem Relation Age of Onset    Hypertension Maternal Grandmother     Diabetes Maternal Grandfather     Breast Cancer Paternal Grandmother        No Known Allergies  Prior to Admission medications    Medication Sig Start Date End Date Taking? Authorizing Provider   PNV66-Iron Fumarate-FA-DSS-DHA 26-1.2- mg cap Take  by mouth. Yes Provider, Historical        Review of Systems: Pertinent items are noted in HPI. 10 point ROS is also positive for general discomforts in the late 3rd trimester.      Objective:     Vitals:  Vitals:    22 2316 22 2317   BP: 114/63 Pulse: (!) 107    Resp: 16    Temp: 98 °F (36.7 °C)    Weight:  91.2 kg (201 lb)   Height:  5' 1\" (1.549 m)        Physical Exam:  Patient without distress. Abdomen: soft, nontender  Fundus: soft and non tender  Perineum: blood absent, amniotic fluid absent  Cervical Exam: 2.5 cm dilated    70% effaced    -2 station    Lower Extremities:  - Edema 1+  Membranes:  Intact  Fetal Heart Rate: Baseline: 125-135 per minute  Variability: moderate  Accelerations: yes  Decelerations: none  Uterine contractions: none    Prenatal Labs:   Lab Results   Component Value Date/Time    Rubella, External immune 09/10/2021 12:00 AM    HBsAg, External negative 09/10/2021 12:00 AM    HIV, External NR 09/10/2021 12:00 AM    RPR, External NR 09/10/2021 12:00 AM    ABO,Rh A positive 09/10/2021 12:00 AM    ABO,Rh A positive 09/10/2021 12:00 AM         Assessment/Plan: 23 yo  at 38+6 wks presents with c/o ctxs. Cervix is unchanged from last in office exam. I reviewed and interpreted the NST as a category 1 tracing.       Active Problems:    Abdominal pain during pregnancy in third trimester (3/28/2022)         Plan:   Discharge  Routine OB follow up and precautions  Labor instructions reviewed

## 2022-04-07 ENCOUNTER — HOSPITAL ENCOUNTER (INPATIENT)
Age: 22
LOS: 2 days | Discharge: HOME | DRG: 560 | End: 2022-04-09
Attending: OBSTETRICS & GYNECOLOGY | Admitting: OBSTETRICS & GYNECOLOGY
Payer: COMMERCIAL

## 2022-04-07 ENCOUNTER — ANESTHESIA EVENT (OUTPATIENT)
Dept: LABOR AND DELIVERY | Age: 22
DRG: 560 | End: 2022-04-07
Payer: COMMERCIAL

## 2022-04-07 ENCOUNTER — ANESTHESIA (OUTPATIENT)
Dept: LABOR AND DELIVERY | Age: 22
DRG: 560 | End: 2022-04-07
Payer: COMMERCIAL

## 2022-04-07 DIAGNOSIS — Z34.90 ENCOUNTER FOR ELECTIVE INDUCTION OF LABOR: Primary | ICD-10-CM

## 2022-04-07 LAB
ABO + RH BLD: NORMAL
BASE EXCESS BLD CALC-SCNC: 0.3 MMOL/L
BASE EXCESS BLD CALC-SCNC: 2.1 MMOL/L
BLOOD GROUP ANTIBODIES SERPL: NORMAL
ERYTHROCYTE [DISTWIDTH] IN BLOOD BY AUTOMATED COUNT: 15.1 % (ref 11.9–14.6)
HCO3 BLD-SCNC: 29.9 MMOL/L (ref 22–26)
HCO3 BLDV-SCNC: 23.9 MMOL/L (ref 23–28)
HCT VFR BLD AUTO: 37.3 % (ref 35.8–46.3)
HGB BLD-MCNC: 12.1 G/DL (ref 11.7–15.4)
MCH RBC QN AUTO: 25.6 PG (ref 26.1–32.9)
MCHC RBC AUTO-ENTMCNC: 32.4 G/DL (ref 31.4–35)
MCV RBC AUTO: 79 FL (ref 79.6–97.8)
NRBC # BLD: 0 K/UL (ref 0–0.2)
PCO2 BLDCO: 36 MMHG (ref 32–68)
PCO2 BLDCO: 57 MMHG (ref 32–68)
PH BLDCO: 7.33 [PH] (ref 7.15–7.38)
PH BLDCO: 7.44 [PH] (ref 7.15–7.38)
PLATELET # BLD AUTO: 206 K/UL (ref 150–450)
PMV BLD AUTO: 12.6 FL (ref 9.4–12.3)
PO2 BLDCO: 15 MMHG
PO2 BLDCO: 27 MMHG
RBC # BLD AUTO: 4.72 M/UL (ref 4.05–5.2)
SAO2 % BLD: 17 % (ref 95–98)
SAO2 % BLDV: 54 % (ref 65–88)
SERVICE CMNT-IMP: ABNORMAL
SERVICE CMNT-IMP: ABNORMAL
SPECIMEN EXP DATE BLD: NORMAL
SPECIMEN TYPE: ABNORMAL
SPECIMEN TYPE: ABNORMAL
WBC # BLD AUTO: 12.2 K/UL (ref 4.3–11.1)

## 2022-04-07 PROCEDURE — 74011250637 HC RX REV CODE- 250/637: Performed by: OBSTETRICS & GYNECOLOGY

## 2022-04-07 PROCEDURE — 86900 BLOOD TYPING SEROLOGIC ABO: CPT

## 2022-04-07 PROCEDURE — 74011250636 HC RX REV CODE- 250/636: Performed by: NURSE ANESTHETIST, CERTIFIED REGISTERED

## 2022-04-07 PROCEDURE — A4300 CATH IMPL VASC ACCESS PORTAL: HCPCS | Performed by: ANESTHESIOLOGY

## 2022-04-07 PROCEDURE — 75410000000 HC DELIVERY VAGINAL/SINGLE: Performed by: OBSTETRICS & GYNECOLOGY

## 2022-04-07 PROCEDURE — 2709999900 HC NON-CHARGEABLE SUPPLY

## 2022-04-07 PROCEDURE — 00HU33Z INSERTION OF INFUSION DEVICE INTO SPINAL CANAL, PERCUTANEOUS APPROACH: ICD-10-PCS | Performed by: ANESTHESIOLOGY

## 2022-04-07 PROCEDURE — 82803 BLOOD GASES ANY COMBINATION: CPT

## 2022-04-07 PROCEDURE — 76060000078 HC EPIDURAL ANESTHESIA: Performed by: OBSTETRICS & GYNECOLOGY

## 2022-04-07 PROCEDURE — 65270000029 HC RM PRIVATE

## 2022-04-07 PROCEDURE — 74011000250 HC RX REV CODE- 250: Performed by: ANESTHESIOLOGY

## 2022-04-07 PROCEDURE — 85027 COMPLETE CBC AUTOMATED: CPT

## 2022-04-07 PROCEDURE — 10907ZC DRAINAGE OF AMNIOTIC FLUID, THERAPEUTIC FROM PRODUCTS OF CONCEPTION, VIA NATURAL OR ARTIFICIAL OPENING: ICD-10-PCS | Performed by: OBSTETRICS & GYNECOLOGY

## 2022-04-07 PROCEDURE — 74011250636 HC RX REV CODE- 250/636: Performed by: OBSTETRICS & GYNECOLOGY

## 2022-04-07 PROCEDURE — 77030014125 HC TY EPDRL BBMI -B: Performed by: ANESTHESIOLOGY

## 2022-04-07 PROCEDURE — 59409 OBSTETRICAL CARE: CPT | Performed by: OBSTETRICS & GYNECOLOGY

## 2022-04-07 PROCEDURE — 75410000003 HC RECOV DEL/VAG/CSECN EA 0.5 HR: Performed by: OBSTETRICS & GYNECOLOGY

## 2022-04-07 PROCEDURE — 3E033VJ INTRODUCTION OF OTHER HORMONE INTO PERIPHERAL VEIN, PERCUTANEOUS APPROACH: ICD-10-PCS | Performed by: OBSTETRICS & GYNECOLOGY

## 2022-04-07 PROCEDURE — 75410000002 HC LABOR FEE PER 1 HR: Performed by: OBSTETRICS & GYNECOLOGY

## 2022-04-07 RX ORDER — LIDOCAINE HYDROCHLORIDE 20 MG/ML
JELLY TOPICAL
Status: DISCONTINUED | OUTPATIENT
Start: 2022-04-07 | End: 2022-04-07 | Stop reason: HOSPADM

## 2022-04-07 RX ORDER — LIDOCAINE HYDROCHLORIDE 10 MG/ML
1 INJECTION INFILTRATION; PERINEURAL
Status: DISCONTINUED | OUTPATIENT
Start: 2022-04-07 | End: 2022-04-07 | Stop reason: HOSPADM

## 2022-04-07 RX ORDER — SIMETHICONE 80 MG
80 TABLET,CHEWABLE ORAL
Status: DISCONTINUED | OUTPATIENT
Start: 2022-04-07 | End: 2022-04-09 | Stop reason: HOSPADM

## 2022-04-07 RX ORDER — ROPIVACAINE HYDROCHLORIDE 2 MG/ML
INJECTION, SOLUTION EPIDURAL; INFILTRATION; PERINEURAL
Status: DISCONTINUED | OUTPATIENT
Start: 2022-04-07 | End: 2022-04-07 | Stop reason: HOSPADM

## 2022-04-07 RX ORDER — OXYTOCIN/RINGER'S LACTATE 30/500 ML
0-20 PLASTIC BAG, INJECTION (ML) INTRAVENOUS
Status: DISCONTINUED | OUTPATIENT
Start: 2022-04-07 | End: 2022-04-07

## 2022-04-07 RX ORDER — FAMOTIDINE 20 MG/1
20 TABLET, FILM COATED ORAL ONCE
Status: DISCONTINUED | OUTPATIENT
Start: 2022-04-07 | End: 2022-04-07 | Stop reason: HOSPADM

## 2022-04-07 RX ORDER — IBUPROFEN 800 MG/1
800 TABLET ORAL EVERY 6 HOURS
Status: DISCONTINUED | OUTPATIENT
Start: 2022-04-07 | End: 2022-04-09 | Stop reason: HOSPADM

## 2022-04-07 RX ORDER — SODIUM CHLORIDE 0.9 % (FLUSH) 0.9 %
5-40 SYRINGE (ML) INJECTION EVERY 8 HOURS
Status: DISCONTINUED | OUTPATIENT
Start: 2022-04-07 | End: 2022-04-07

## 2022-04-07 RX ORDER — OXYCODONE HYDROCHLORIDE 5 MG/1
5 TABLET ORAL
Status: DISCONTINUED | OUTPATIENT
Start: 2022-04-07 | End: 2022-04-09 | Stop reason: HOSPADM

## 2022-04-07 RX ORDER — SODIUM CHLORIDE 0.9 % (FLUSH) 0.9 %
5-40 SYRINGE (ML) INJECTION EVERY 8 HOURS
Status: DISCONTINUED | OUTPATIENT
Start: 2022-04-07 | End: 2022-04-07 | Stop reason: HOSPADM

## 2022-04-07 RX ORDER — DOCUSATE SODIUM 100 MG/1
100 CAPSULE, LIQUID FILLED ORAL 2 TIMES DAILY
Status: DISCONTINUED | OUTPATIENT
Start: 2022-04-07 | End: 2022-04-09 | Stop reason: HOSPADM

## 2022-04-07 RX ORDER — DEXTROSE, SODIUM CHLORIDE, SODIUM LACTATE, POTASSIUM CHLORIDE, AND CALCIUM CHLORIDE 5; .6; .31; .03; .02 G/100ML; G/100ML; G/100ML; G/100ML; G/100ML
125 INJECTION, SOLUTION INTRAVENOUS CONTINUOUS
Status: DISCONTINUED | OUTPATIENT
Start: 2022-04-07 | End: 2022-04-07

## 2022-04-07 RX ORDER — SODIUM CHLORIDE 0.9 % (FLUSH) 0.9 %
5-40 SYRINGE (ML) INJECTION AS NEEDED
Status: DISCONTINUED | OUTPATIENT
Start: 2022-04-07 | End: 2022-04-07

## 2022-04-07 RX ORDER — ACETAMINOPHEN 325 MG/1
650 TABLET ORAL
Status: DISCONTINUED | OUTPATIENT
Start: 2022-04-07 | End: 2022-04-09 | Stop reason: HOSPADM

## 2022-04-07 RX ORDER — LIDOCAINE HYDROCHLORIDE AND EPINEPHRINE 15; 5 MG/ML; UG/ML
INJECTION, SOLUTION EPIDURAL
Status: COMPLETED | OUTPATIENT
Start: 2022-04-07 | End: 2022-04-07

## 2022-04-07 RX ORDER — OXYTOCIN/RINGER'S LACTATE 30/500 ML
10 PLASTIC BAG, INJECTION (ML) INTRAVENOUS AS NEEDED
Status: DISCONTINUED | OUTPATIENT
Start: 2022-04-07 | End: 2022-04-07

## 2022-04-07 RX ORDER — MINERAL OIL
120 OIL (ML) ORAL
Status: DISCONTINUED | OUTPATIENT
Start: 2022-04-07 | End: 2022-04-07 | Stop reason: HOSPADM

## 2022-04-07 RX ORDER — OXYTOCIN/RINGER'S LACTATE 30/500 ML
87.3 PLASTIC BAG, INJECTION (ML) INTRAVENOUS AS NEEDED
Status: DISCONTINUED | OUTPATIENT
Start: 2022-04-07 | End: 2022-04-07

## 2022-04-07 RX ORDER — ONDANSETRON 4 MG/1
4 TABLET, ORALLY DISINTEGRATING ORAL
Status: ACTIVE | OUTPATIENT
Start: 2022-04-07 | End: 2022-04-08

## 2022-04-07 RX ORDER — SODIUM CHLORIDE 0.9 % (FLUSH) 0.9 %
5-40 SYRINGE (ML) INJECTION AS NEEDED
Status: DISCONTINUED | OUTPATIENT
Start: 2022-04-07 | End: 2022-04-07 | Stop reason: HOSPADM

## 2022-04-07 RX ORDER — BUTORPHANOL TARTRATE 2 MG/ML
1 INJECTION INTRAMUSCULAR; INTRAVENOUS
Status: DISCONTINUED | OUTPATIENT
Start: 2022-04-07 | End: 2022-04-07 | Stop reason: HOSPADM

## 2022-04-07 RX ORDER — DIPHENHYDRAMINE HCL 25 MG
25 CAPSULE ORAL
Status: DISCONTINUED | OUTPATIENT
Start: 2022-04-07 | End: 2022-04-09 | Stop reason: HOSPADM

## 2022-04-07 RX ADMIN — ROPIVACAINE HYDROCHLORIDE 10 ML/HR: 2 INJECTION, SOLUTION EPIDURAL; INFILTRATION at 12:36

## 2022-04-07 RX ADMIN — SODIUM CHLORIDE, SODIUM LACTATE, POTASSIUM CHLORIDE, CALCIUM CHLORIDE, AND DEXTROSE MONOHYDRATE 125 ML/HR: 600; 310; 30; 20; 5 INJECTION, SOLUTION INTRAVENOUS at 08:17

## 2022-04-07 RX ADMIN — ACETAMINOPHEN 650 MG: 325 TABLET, FILM COATED ORAL at 19:57

## 2022-04-07 RX ADMIN — SODIUM CHLORIDE, SODIUM LACTATE, POTASSIUM CHLORIDE, AND CALCIUM CHLORIDE 500 ML: 600; 310; 30; 20 INJECTION, SOLUTION INTRAVENOUS at 08:16

## 2022-04-07 RX ADMIN — IBUPROFEN 800 MG: 800 TABLET, FILM COATED ORAL at 16:41

## 2022-04-07 RX ADMIN — DOCUSATE SODIUM 100 MG: 100 CAPSULE ORAL at 17:20

## 2022-04-07 RX ADMIN — IBUPROFEN 800 MG: 800 TABLET, FILM COATED ORAL at 22:49

## 2022-04-07 RX ADMIN — LIDOCAINE HYDROCHLORIDE,EPINEPHRINE BITARTRATE 5 ML: 15; .005 INJECTION, SOLUTION EPIDURAL; INFILTRATION; INTRACAUDAL; PERINEURAL at 12:24

## 2022-04-07 RX ADMIN — Medication 1 MILLI-UNITS/MIN: at 08:14

## 2022-04-07 RX ADMIN — OXYCODONE 5 MG: 5 TABLET ORAL at 16:41

## 2022-04-07 RX ADMIN — OXYCODONE 5 MG: 5 TABLET ORAL at 22:10

## 2022-04-07 RX ADMIN — WITCH HAZEL 1 PAD: 500 SOLUTION RECTAL; TOPICAL at 17:20

## 2022-04-07 RX ADMIN — SODIUM CHLORIDE, SODIUM LACTATE, POTASSIUM CHLORIDE, AND CALCIUM CHLORIDE 500 ML: 600; 310; 30; 20 INJECTION, SOLUTION INTRAVENOUS at 12:00

## 2022-04-07 NOTE — PROGRESS NOTES
Labor Progress Note  Patient seen, fetal heart rate and contraction pattern evaluated, patient examined.   Patient Vitals for the past 1 hrs:   BP Pulse   04/07/22 1312 114/61 (!) 120       Physical Exam:  Cervical Exam:  10 cm dilated    100% effaced    +1 station    Uterine Activity: Frequency: Every 2 minutes  Fetal Heart Rate: reassuring now had recent deceleration as went to complete resolved spontaneously with position change and IV fluid bolus    Assessment/Plan:  Reassuring fetal status, Continue plan for vaginal delivery

## 2022-04-07 NOTE — PROGRESS NOTES
Chung Certain      Dr Prisca Powers at bedside at [phone]. LAURA Greenfield at bedside at [phone]. Assisted pt to sitting up on bedside at 1223. Timeout completed at [phone] with MD, CRNA and myself at bedside. Test dose given at 1235. Negative reaction. Dose given at 1236. Pt assisted to lying back in left tilt position. See anesthesia record for details. See vital sign flow sheet for BP. Tolerated procedure well.

## 2022-04-07 NOTE — PROGRESS NOTES
SBAR OUT Report: Mother    Verbal report given to Yoel Can  on this patient, who is now being transferred to [facility]) for routine progression of care. The patient is not wearing a green \"Anesthesia-Duramorph\" band. Report consisted of patient's Situation, Background, Assessment and Recommendations (SBAR).  ID bands were compared with the identification form, and verified with the patient and receiving nurse. Information from the SBAR and the [address] [facility] Report was reviewed with the receiving nurse; opportunity for questions and clarification provided.

## 2022-04-07 NOTE — H&P
History & Physical    Name: Daniel Knox MRN: [id number]  SSN: [id number]    YOB: 2000  Age: 24 y.o. Sex: female      Subjective:     Estimated Date of Delivery: 22  OB History    Para Term  AB Living   2 1 1     1   SAB IAB Ectopic Molar Multiple Live Births             1      # Outcome Date GA Lbr Ze/2nd Weight Sex Delivery Anes PTL Lv   2 Current            1 Term 19 38w4d  6 lb 6 oz (2.892 kg) M Vag-Spont EPI  FORD       Ms. Alverto Martin is admitted with pregnancy at 39w3d for induction of labor due to favorable cervix at term. Prenatal course was complicated by Covid 19 infection in pregnancy. Please see prenatal records for details. Past Medical History:   Diagnosis Date    Contact dermatitis     Disease of blood and blood forming organ     Eczema     Gestational diabetes     prior pregnancy    Rhinitis, allergic     Strep pharyngitis     Vitamin D deficiency      Past Surgical History:   Procedure Laterality Date    HX HERNIA REPAIR       Social History     Occupational History    Not on file   Tobacco Use    Smoking status: Never Smoker    Smokeless tobacco: Never Used   Vaping Use    Vaping Use: Never used   Substance and Sexual Activity    Alcohol use: No    Drug use: No    Sexual activity: Yes     Partners: Male     Birth control/protection: None     Family History   Problem Relation Age of Onset    Hypertension Maternal Grandmother     Diabetes Maternal Grandfather     Breast Cancer Paternal Grandmother        No Known Allergies  Prior to Admission medications    Medication Sig Start Date End Date Taking? Authorizing Provider   PNV66-Iron Fumarate-FA-DSS-DHA 26-1.2- mg cap Take  by mouth. Yes Provider, Historical        Review of Systems: A comprehensive review of systems was negative except for that written in the History of Present Illness.     Objective:     Vitals:  Vitals:    22 0739   BP: 122/73   Pulse: (!) 120 Resp: 16   Temp: 97.8 °F (36.6 °C)   SpO2: 100%   Weight: 201 lb (91.2 kg)   Height: 5' 2.76\" (1.594 m)        Physical Exam:  Patient without distress. Heart: Regular rate and rhythm  Lung: clear to auscultation throughout lung fields, no wheezes, no rales, no rhonchi and normal respiratory effort  Cervical Exam: 3 cm dilated    50% effaced    -3 station    Membranes:  Artificial Rupture of Membranes; Amniotic Fluid: medium amount of clear fluid  Fetal Heart Rate: reassuring          Prenatal Labs:   Lab Results   Component Value Date/Time    Rubella, External immune 09/10/2021 12:00 AM    HBsAg, External negative 09/10/2021 12:00 AM    HIV, External NR 09/10/2021 12:00 AM    RPR, External NR 09/10/2021 12:00 AM    ABO,Rh A positive 09/10/2021 12:00 AM    ABO,Rh A positive 09/10/2021 12:00 AM       Impression/Plan:     Principal Problem:    Encounter for elective induction of labor (4/7/2022)         Plan: Admit for induction of labor. Group B Strep negative.

## 2022-04-07 NOTE — L&D DELIVERY NOTE
Delivery Summary    Patient: Elsa Cavanaugh MRN: [id number]  SSN: [id number]    YOB: 2000  Age: 24 y.o. Sex: female       Information for the patient's :  Gina Gutierrez [[id number]]       Labor Events:    Labor: No    Steroids: None   Cervical Ripening Date/Time:       Cervical Ripening Type: None   Antibiotics During Labor:     Rupture Identifier:      Rupture Date/Time: 2022 8:15 AM   Rupture Type: AROM   Amniotic Fluid Volume: Moderate    Amniotic Fluid Description: Clear    Amniotic Fluid Odor: None    Induction: AROM; Oxytocin       Induction Date/Time: 2022 8:00 AM    Indications for Induction: Elective    Augmentation: None   Augmentation Date/Time:      Indications for Augmentation:     Labor complications: None       Additional complications:        Delivery Events:  Indications For Episiotomy:     Episiotomy: None   Perineal Laceration(s): None   Repaired:     Periurethral Laceration Location:      Repaired:     Labial Laceration Location:     Repaired:     Sulcal Laceration Location:     Repaired:     Vaginal Laceration Location:     Repaired:     Cervical Laceration Location:     Repaired:     Repair Suture: None   Number of Repair Packets: 0   Estimated Blood Loss (ml):  ml   Quantitative Blood Loss (ml)                Delivery Date: 2022    Delivery Time: 2:41 PM  Delivery Type: Vaginal, Spontaneous  Sex:  Female    Gestational Age: 38w3d   Delivery Clinician:   Aye Arreguin  Living Status: Living   Delivery Location: L&D            APGARS  One minute Five minutes Ten minutes   Skin color: 1   1        Heart rate: 2   2        Grimace: 2   2        Muscle tone: 1   2        Breathin   2        Totals: 8   9            Presentation: Vertex    Position: Right Occiput Anterior  Resuscitation Method:  None;Tactile Stimulation     Meconium Stained: None      Cord Information: 3 Vessels  Complications: None;Nuchal Cord Without Compressions;Nuchal Cord With Compressions  Cord around: head  Delayed cord clamping? Yes  Cord clamped date/time:2022  2:23 PM  Disposition of Cord Blood: Lab    Blood Gases Sent?: Yes    Placenta:  Date/Time: 2022  2:48 PM  Removal: Spontaneous      Appearance: Normal;Intact     [facility] Measurements:  Birth Weight: 7 lb 2.6 oz (3.25 kg)      Birth Length: 1' 8.47\" (0.52 m)      Head Circumference: 1' 1.39\" (0.34 m)      Chest Circumference: 1' 1.19\" (0.335 m)     Abdominal Girth: Other Providers:   Catherine Hamm, INOCENCIO Frances, Obstetrician;Primary Nurse;Primary [facility] Nurse;Nicu Nurse;Neonatologist;Anesthesiologist;Crna;Nurse Practitioner;Midwife;Nursery Nurse           Group B Strep: No results found for: GRBSEXT, GRBSEXT  Information for the patient's :  Luis Hustonleila [[id number]]   No results found for: ABORH, PCTABR, PCTDIG, BILI, ABORHEXT, ABORH     No results for input(s): PCO2CB, PO2CB, HCO3I, SO2I, IBD, PTEMPI, SPECTI, PHICB, ISITE, IDEV, IALLEN in the last 72 hours. Patient set up for delivery with delivery of head over intact perineum. Tight nuchal cord was noted and was able to be reduced. The posterior arm (right arm) had the hand delivering with the head and therefore the posterior arm was delivered along with the anterior shoulder. Infant was bulb suctioned on the perineum with spontaneous cry. Remainder of the infant was delivered without complication delayed cord clamping was done and infant was placed on maternal chest.  Placenta was delivered intact with three-vessel cord after cord blood and cord gases have been drawn. Careful inspection showed no evidence of lacerations. Fundus was massaged and noted to be firm. Mother and baby are doing well.

## 2022-04-07 NOTE — ANESTHESIA PREPROCEDURE EVALUATION
Relevant Problems   NEUROLOGY   (+) Headache in pregnancy, antepartum, third trimester      ENDOCRINE   (+) Obesity affecting pregnancy in second trimester       Anesthetic History   No history of anesthetic complications            Review of Systems / Medical History  Patient summary reviewed, nursing notes reviewed and pertinent labs reviewed    Pulmonary  Within defined limits                 Neuro/Psych   Within defined limits           Cardiovascular                  Exercise tolerance: >4 METS     GI/Hepatic/Renal  Within defined limits              Endo/Other  Within defined limits           Other Findings              Physical Exam    Airway  Mallampati: II  TM Distance: 4 - 6 cm  Neck ROM: normal range of motion   Mouth opening: Normal     Cardiovascular  Regular rate and rhythm,  S1 and S2 normal,  no murmur, click, rub, or gallop             Dental  No notable dental hx       Pulmonary  Breath sounds clear to auscultation               Abdominal         Other Findings            Anesthetic Plan    ASA: 2  Anesthesia type: epidural          Induction: Intravenous  Anesthetic plan and risks discussed with: Patient

## 2022-04-07 NOTE — PROGRESS NOTES
Epidural DCd. Intact. St. Cath for 400 cc uop. Pericare given. Pads and panties on. Pt denies need for pain meds at this time.

## 2022-04-07 NOTE — PROGRESS NOTES
SBAR IN Report: Mother    Verbal report received from [employer]us [employer] RN on this patient, who is now being transferred from L&D (unit) for routine progression of care. The patient is not wearing a green \"Anesthesia-Duramorph\" band. Report consisted of patient's Situation, Background, Assessment and Recommendations (SBAR).  ID bands were compared with the identification form, and verified with the patient and transferring nurse. Information from the SBAR, Kardex, Procedure Summary, Intake/Output, MAR and Recent Results and the [place] Report was reviewed with the transferring nurse; opportunity for questions and clarification provided.

## 2022-04-07 NOTE — PROGRESS NOTES
Admission assessment complete as noted. Patient oriented to room and unit. Plan of care reviewed and patient verbalizes understanding. Questions encouraged and answered. Patient encouraged to call for needs or concerns. Safety Teaching reviewed:   1. Hand hygiene prior to handling the infant. 2. Use of bulb syringe. 3. Bracelets with matching numbers are placed on mother and infant  4. An infant security tag  [facility]) is placed on the infant's ankle and monitored  5. All OB nurses wear pink Employee badges - do not give your baby to anyone without proper identification. 6. Never leave the baby alone in the room. 7. The infant should be placed on their back to sleep. on a firm mattress. No toys should be placed in the crib. (safe sleep video offered to view)  8. Never shake the baby (video offered to view)  9. Infant fall prevention - do not sleep with the baby, and place the baby in the crib while ambulating. 8. Mother and Baby Care booklet given to Mother.

## 2022-04-07 NOTE — ANESTHESIA PROCEDURE NOTES
Epidural Block    Patient location during procedure: OB  Start time: 4/7/2022 12:24 PM  End time: 4/7/2022 12:36 PM  Reason for block: labor epidural  Staffing  Performed: attending   Anesthesiologist: Dieter Bustamante MD  Preanesthetic Checklist  Completed: patient identified, risks and benefits discussed, surgical consent, pre-op evaluation and timeout performed  Block Placement  Patient position: sitting  Prep: ChloraPrep  Sterility prep: cap, drape, gloves, hand and mask  Sedation level: no sedation  Patient monitoring: continuous pulse oximetry and heart rate  Approach: midline  Location: lumbar  Lumbar location: L3-L4  Epidural  Loss of resistance technique: air  Guidance: landmark technique  Needle  Needle type: Tuohy   Needle gauge: 17 G  Needle length: 10 cm  Catheter type: end hole  Catheter size: 19 G  Catheter securement method: clear occlusive dressing, liquid medical adhesive and surgical tape  Test dose: negative  Medications Administered  Lidocaine-EPINEPHrine (XYLOCAINE) 1.5 %-1:200,000 Epidural, 5 mL  Assessment  Block outcome: pain improved  Number of attempts: 1  Procedure assessment: patient tolerated procedure well with no immediate complications

## 2022-04-07 NOTE — PROGRESS NOTES
Patient up to bathroom. Bebe-care taught and completed. Questions encouraged and answered. Patient ambulating without difficulty, encouraged to call for needs or concerns. Verbalizes understanding.

## 2022-04-08 PROCEDURE — 65270000029 HC RM PRIVATE

## 2022-04-08 PROCEDURE — 2709999900 HC NON-CHARGEABLE SUPPLY

## 2022-04-08 PROCEDURE — 74011250637 HC RX REV CODE- 250/637: Performed by: OBSTETRICS & GYNECOLOGY

## 2022-04-08 RX ORDER — OXYCODONE HYDROCHLORIDE 5 MG/1
5 TABLET ORAL
Qty: 12 TABLET | Refills: 0 | Status: SHIPPED | OUTPATIENT
Start: 2022-04-08 | End: 2022-04-11

## 2022-04-08 RX ORDER — IBUPROFEN 800 MG/1
800 TABLET ORAL EVERY 6 HOURS
Qty: 40 TABLET | Refills: 0 | Status: SHIPPED | OUTPATIENT
Start: 2022-04-08

## 2022-04-08 RX ORDER — ACETAMINOPHEN 325 MG/1
650 TABLET ORAL
Qty: 40 TABLET | Refills: 0 | Status: SHIPPED | OUTPATIENT
Start: 2022-04-08

## 2022-04-08 RX ADMIN — ACETAMINOPHEN 650 MG: 325 TABLET, FILM COATED ORAL at 17:48

## 2022-04-08 RX ADMIN — IBUPROFEN 800 MG: 800 TABLET, FILM COATED ORAL at 05:33

## 2022-04-08 RX ADMIN — OXYCODONE 5 MG: 5 TABLET ORAL at 18:31

## 2022-04-08 RX ADMIN — IBUPROFEN 800 MG: 800 TABLET, FILM COATED ORAL at 14:23

## 2022-04-08 RX ADMIN — OXYCODONE 5 MG: 5 TABLET ORAL at 12:32

## 2022-04-08 RX ADMIN — DOCUSATE SODIUM 100 MG: 100 CAPSULE ORAL at 17:48

## 2022-04-08 RX ADMIN — WITCH HAZEL 1 PAD: 500 SOLUTION RECTAL; TOPICAL at 12:32

## 2022-04-08 RX ADMIN — ACETAMINOPHEN 650 MG: 325 TABLET, FILM COATED ORAL at 10:17

## 2022-04-08 RX ADMIN — ACETAMINOPHEN 650 MG: 325 TABLET, FILM COATED ORAL at 01:44

## 2022-04-08 RX ADMIN — IBUPROFEN 800 MG: 800 TABLET, FILM COATED ORAL at 22:26

## 2022-04-08 RX ADMIN — DOCUSATE SODIUM 100 MG: 100 CAPSULE ORAL at 10:17

## 2022-04-08 RX ADMIN — OXYCODONE 5 MG: 5 TABLET ORAL at 06:35

## 2022-04-08 NOTE — PROGRESS NOTES
04/07/22 7860   Pain 1   Pain Scale 1 Numeric (0 - 10)   Pain Intensity 1 6   Patient Stated Pain Goal 4   Pain Onset 1 breastfeeding   Pain Location 1 Abdomen;Back;Head   Pain Orientation 1 Lower; Anterior;Posterior   Pain Description 1 Cramping;Pressure   Pain Intervention(s) 1 Medication (see MAR)       Oxycodone given for pain per patient request. Primary RN aware.

## 2022-04-08 NOTE — LACTATION NOTE
This note was copied from a baby's chart. Mom requested \"something\" for her nipples. Lanolin taken to mom. Infant was latched and sucking rhythmically on mom's left breast in the cradle hold. Deep latch noted.

## 2022-04-08 NOTE — PROGRESS NOTES
Chart reviewed - no needs identified. SW met with patient while social distancing w/appropriate PPE. Patient denies any history of postpartum depression/anxiety. Patient given informational packet on  mood & anxiety disorders (resources/education). Family denies any additional needs from  at this time. Family has 's contact information should any needs/questions arise.     ROBERTO Skinner, [facility] [place]   [phone]

## 2022-04-08 NOTE — PROGRESS NOTES
Post-Partum Day Number 1 Progress/Discharge Note    Patient doing well post-partum without significant complaint. Voiding without difficulty, normal lochia, positive flatus. Vitals:  Patient Vitals for the past 8 hrs:   BP Temp Pulse Resp SpO2   22 0716 102/62 98 °F (36.7 °C) 70 17 95 %     Temp (24hrs), Av.9 °F (36.6 °C), Min:97.7 °F (36.5 °C), Max:98 °F (36.7 °C)      Vital signs stable, afebrile. Exam:  Patient without distress. Abdomen soft, fundus firm at level of umbilicus, non tender               Lower extremities are negative for swelling, cords or tenderness. Lab/Data Review: All lab results for the last 24 hours reviewed. Assessment and Plan:  Patient appears to be having uncomplicated post-partum course. Continue routine perineal care and maternal education. Plan discharge for today with follow up in our office in 2 weeks.

## 2022-04-08 NOTE — LACTATION NOTE
This note was copied from a baby's chart. Called back to room per mom. Infant latched and sucking rhythmically. Mom stated that infant has been nursing for 3 hours with her alternating breasts every 30 minutes. Informed mom that she needs a break at this time and offered to hand her off to dad. Placed infant in dad's arms and infant had her eyes closed and was content. Mom has decided to stay until tomorrow.

## 2022-04-08 NOTE — DISCHARGE SUMMARY
Obstetrical Discharge Summary     Name: Julia Perez MRN: [id number]  SSN: [id number]    YOB: 2000  Age: 24 y.o. Sex: female      Allergies: Patient has no known allergies. Admit Date: 2022    Discharge Date: 2022     Admitting Physician: Neo Polanco MD     Attending Physician:  Carlo Lopez MD     * Admission Diagnoses: Encounter for elective induction of labor [Z34.90]    * Discharge Diagnoses:   Information for the patient's :  Inna Shoemaker [[id number]]   Delivery of a 7 lb 2.6 oz (3.25 kg) female infant via Vaginal, Spontaneous on 2022 at 2:41 PM  by Marc Jimenez. Apgars were 8  and 9 . Additional Diagnoses:   Hospital Problems as of 2022 Date Reviewed: 2022          Codes Class Noted - Resolved POA    * (Principal) Encounter for elective induction of labor ICD-10-CM: Z34.90  ICD-9-CM: V22.1  2022 - Present Unknown             Lab Results   Component Value Date/Time    ABO/Rh(D) A POSITIVE 2022 07:16 AM    Rubella, External immune 09/10/2021 12:00 AM    ABO,Rh A positive 09/10/2021 12:00 AM    ABO,Rh A positive 09/10/2021 12:00 AM      Immunization History   Administered Date(s) Administered    HPV 2013, 04/15/2016    MMR 2019    Meningococcal ACWY Vaccine 04/15/2016    Tdap 2019       * Procedures: vaginal delivery  * No surgery found *           * Discharge Condition: good    * Hospital Course: Normal hospital course following the delivery. * Disposition: Home    Discharge Medications:   Current Discharge Medication List      START taking these medications    Details   acetaminophen (TYLENOL) 325 mg tablet Take 2 Tablets by mouth every four (4) hours as needed for Pain. Qty: 40 Tablet, Refills: 0  Start date: 2022      ibuprofen (MOTRIN) 800 mg tablet Take 1 Tablet by mouth every six (6) hours.   Qty: 40 Tablet, Refills: 0  Start date: 2022      oxyCODONE IR (ROXICODONE) 5 mg immediate release tablet Take 1 Tablet by mouth every four (4) hours as needed for Pain for up to 3 days. Max Daily Amount: 30 mg.  Qty: 12 Tablet, Refills: 0  Start date: 4/8/2022, End date: 4/11/2022    Associated Diagnoses: Normal delivery         CONTINUE these medications which have NOT CHANGED    Details   PNV66-Iron Fumarate-FA-DSS-DHA 26-1.2- mg cap Take  by mouth. * Follow-up Care/Patient Instructions:   Activity: No sex for 6 weeks, No driving while on analgesics and No heavy lifting for 6 weeks  Diet: Regular Diet  Wound Care: Keep wound clean and dry    Follow-up Information     Follow up With Specialties Details Why Contact Info    Kayla Menendez NP Family Medicine   [address] [place] [id number] [phone]

## 2022-04-08 NOTE — LACTATION NOTE
"  \"Think I have bronchitis\" - \"Cough\"     Was just in to see Sandra yesterday - not bad then -- seems to have worsening overnight       Yes some inspiratory and expiratory wheezing noted by pt     Yes occasional cough - not productive yet  - \"Loose\" -  \"I can only cough it up so far\"      Taking Mucinex (plain) at home starting last night       No fever      No throat pain   No ear pain   No swelling / edema noted anywhere      She sounds ok on the phone - clear voice - full sentences - I can hear just a little bit of wheezing at end of sentences on the phone with her         A/P:   >Push fluids to loosen cough / secretions - warm liquids especially to help      > Plain Mucinex (guiafenasin only) is a good option if have taken it before for these spells          I will message provider to see what they would suggest otherwise as you were just seen in office       Confirmed allergies   Confirmed pharmacy      Pt tele# [phone]       Evert Menjivar RN   Triage and Medication Refills      Reason for Disposition    MILD difficulty breathing (e.g., minimal/no SOB at rest, SOB with walking, pulse < 100) of new onset or worse than normal    Protocols used: BREATHING DIFFICULTY-A-OH      " This note was copied from a baby's chart. In to see mom and infant for the first time. Infant was asleep in the crib. Mom stated that infant has been latching and nursing well. Stated that her nipples are sore and we reviewed positioning. Mom and infant are planning an early discharge. Reviewed discharge information as well as second night of life. Encouraged mom to follow up with lactation consultant as needed.

## 2022-04-09 VITALS
TEMPERATURE: 97.5 F | HEIGHT: 63 IN | RESPIRATION RATE: 16 BRPM | OXYGEN SATURATION: 97 % | SYSTOLIC BLOOD PRESSURE: 104 MMHG | HEART RATE: 60 BPM | BODY MASS INDEX: 35.61 KG/M2 | WEIGHT: 201 LBS | DIASTOLIC BLOOD PRESSURE: 65 MMHG

## 2022-04-09 PROCEDURE — 74011250637 HC RX REV CODE- 250/637: Performed by: OBSTETRICS & GYNECOLOGY

## 2022-04-09 PROCEDURE — 2709999900 HC NON-CHARGEABLE SUPPLY

## 2022-04-09 RX ADMIN — IBUPROFEN 800 MG: 800 TABLET, FILM COATED ORAL at 05:20

## 2022-04-09 RX ADMIN — DOCUSATE SODIUM 100 MG: 100 CAPSULE ORAL at 08:39

## 2022-04-09 RX ADMIN — ACETAMINOPHEN 650 MG: 325 TABLET, FILM COATED ORAL at 08:39

## 2022-04-09 NOTE — PROGRESS NOTES
Post-Partum Day Number 2 Progress Note    Patient doing well  without significant complaints. Pain controlled on current medication. Voiding without difficulty, normal lochia. Vitals:    Visit Vitals  /65 (BP 1 Location: Right arm, BP Patient Position: At rest)   Pulse 60   Temp 97.5 °F (36.4 °C)   Resp 16   Ht 5' 2.76\" (1.594 m)   Wt 201 lb (91.2 kg)   LMP 07/10/2021 (Approximate)   SpO2 97%   Breastfeeding Yes   BMI 35.88 kg/m²       Vital signs stable, afebrile. Exam:  Patient without distress. Heart rrr  Lungs cta b&s               Abdomen soft, fundus firm at level of umbilicus, nontender. Lower extremities are negative for swelling, cords or tenderness. Lab Results   Component Value Date/Time    ABO Group A 09/10/2021 08:55 AM    Rh (D) Positive 09/10/2021 08:55 AM    ABO/Rh(D) A POSITIVE 04/07/2022 07:16 AM        Lab Results   Component Value Date/Time    ABO/Rh(D) A POSITIVE 04/07/2022 07:16 AM    Antibody screen NEG 04/07/2022 07:16 AM    Antibody screen, External negative 09/10/2021 12:00 AM    Antibody screen, External negative 09/10/2021 12:00 AM    Rubella, External immune 09/10/2021 12:00 AM    ABO,Rh A positive 09/10/2021 12:00 AM    ABO,Rh A positive 09/10/2021 12:00 AM     Lab Results   Component Value Date/Time    HGB 12.1 04/07/2022 07:16 AM    Hgb, External 13.4 09/10/2021 12:00 AM    Hgb, External 13.4 09/10/2021 12:00 AM         Assessment and Plan:  Patient appears to be having uncomplicated post-partum course. Continue routine post-delivery care and maternal education. Breastfeeding. Will discharge home today.

## 2022-04-09 NOTE — PROGRESS NOTES
Patient discharged to home per Dr. Maximo Bronson orders. Discharge instructions reviewed with patient and pt given a copy. Questions encouraged and answered. Patient verbalizes understanding. Patient escorted by [facility] staff ARASH Spaulding)  to private vehicle. Pt declines w/c. Stable at discharge.

## 2022-04-09 NOTE — DISCHARGE SUMMARY
Obstetrical Discharge Summary     Name: Johanna Aragon MRN: [id number]  SSN: [id number]    YOB: 2000  Age: 24 y.o. Sex: female      Allergies: Patient has no known allergies. Admit Date: 2022    Discharge Date: 2022     Admitting Physician: Cheikh Fajardo MD     Attending Physician:  Maday Rebollar MD     * Admission Diagnoses: Encounter for elective induction of labor [Z34.90]    * Discharge Diagnoses:   Information for the patient's :  Lisette Grewal [[id number]]   Delivery of a 7 lb 2.6 oz (3.25 kg) female infant via Vaginal, Spontaneous on 2022 at 2:41 PM  by Nilo Yeh. Apgars were 8  and 9 . Additional Diagnoses:   Hospital Problems as of 2022 Date Reviewed: 2022          Codes Class Noted - Resolved POA    * (Principal) Encounter for elective induction of labor ICD-10-CM: Z34.90  ICD-9-CM: V22.1  2022 - Present Unknown             Lab Results   Component Value Date/Time    ABO/Rh(D) A POSITIVE 2022 07:16 AM    Rubella, External immune 09/10/2021 12:00 AM    ABO,Rh A positive 09/10/2021 12:00 AM    ABO,Rh A positive 09/10/2021 12:00 AM      Immunization History   Administered Date(s) Administered    HPV 2013, 04/15/2016    MMR 2019    Meningococcal ACWY Vaccine 04/15/2016    Tdap 2019       * Procedures:vaginal delivery          * Discharge Condition: good    * Hospital Course: Normal hospital course following the delivery. * Disposition: Home    Discharge Medications:   Current Discharge Medication List      START taking these medications    Details   acetaminophen (TYLENOL) 325 mg tablet Take 2 Tablets by mouth every four (4) hours as needed for Pain. Qty: 40 Tablet, Refills: 0  Start date: 2022      ibuprofen (MOTRIN) 800 mg tablet Take 1 Tablet by mouth every six (6) hours.   Qty: 40 Tablet, Refills: 0  Start date: 2022      oxyCODONE IR (ROXICODONE) 5 mg immediate release tablet Take 1 Tablet by mouth every four (4) hours as needed for Pain for up to 3 days. Max Daily Amount: 30 mg.  Qty: 12 Tablet, Refills: 0  Start date: 4/8/2022, End date: 4/11/2022    Associated Diagnoses: Normal delivery         CONTINUE these medications which have NOT CHANGED    Details   PNV66-Iron Fumarate-FA-DSS-DHA 26-1.2- mg cap Take  by mouth. * Follow-up Care/Patient Instructions:   Activity: No sex for 6 weeks  Diet: Regular Diet  Wound Care: As directed    Follow-up Information     Follow up With Specialties Details Why Contact Info    Sherry Saucedo NP Family Medicine   [address] [facility]  Joseph Smiley MD Obstetrics & Gynecology, Gynecology, Obstetrics Call in 2 weeks  [address] [facility]  [address] [facility] [place] [facility] [place]  [phone]

## 2022-04-09 NOTE — PROGRESS NOTES
Report received from Mulu Gray RN care assumed. Pt resting in bed with eyes closed and call light within reach.

## 2022-04-09 NOTE — PROGRESS NOTES
Shift assessment complete see flowsheet. Discussed today plan of care with pt. Bleeding precautions given. No s/s of distress noted. Questions encouraged and answered. Pt to call with needs/concerns. EPDS 4.

## 2022-04-09 NOTE — DISCHARGE INSTRUCTIONS
Patient Education        After Your Delivery (the Postpartum Period): Care Instructions  Overview     Congratulations on the birth of your baby. Like pregnancy, the  period can be a time of excitement, fany, and exhaustion. You may look at your wondrous little baby and feel happy. You may also be overwhelmed by your new sleep hours and new responsibilities. At first, babies often sleep during the days and are awake at night. They do not have a pattern or routine. They may make sudden gasps, jerk themselves awake, or look like they have crossed eyes. These are all normal, and they may even make you smile. In these first weeks after delivery, try to take good care of yourself. It may take 4 to 6 weeks to feel like yourself again, and possibly longer if you had a  birth. You will likely feel very tired for several weeks. Your days will be full of ups and downs, but lots of fany as well. Follow-up care is a key part of your treatment and safety. Be sure to make and go to all appointments, and call your doctor if you are having problems. It's also a good idea to know your test results and keep a list of the medicines you take. How can you care for yourself at home? Take care of your body after delivery  · Use pads instead of tampons for the bloody flow that may last as long as 2 weeks. · Ease cramps with ibuprofen (Advil, Motrin). · Ease soreness of hemorrhoids and the area between your vagina and rectum with ice compresses or witch hazel pads. · Ease constipation by drinking lots of fluid and eating high-fiber foods. Ask your doctor about over-the-counter stool softeners. · Cleanse yourself with a gentle squeeze of warm water from a bottle instead of wiping with toilet paper. · Take a sitz bath in warm water several times a day. · Wear a good nursing bra. Ease sore and swollen breasts with warm, wet washcloths. · If you aren't breastfeeding, use ice rather than heat for breast soreness.   · Your period may not start for several months if you are breastfeeding. You may bleed more, and longer at first, than you did before you got pregnant. · Wait until you are healed (about 4 to 6 weeks) before you have sex. Ask your doctor when it is okay for you to have sex. Try not to travel with your baby for 5 or 6 weeks. If you take a long car trip, make frequent stops to walk around and stretch. Pelvic rest for 6wk   NO driving for 2wk or while taking pain medications  NO lifting >10lb for 2wk  NO tub baths, pools, or hot tubs for 6wk      Avoid exhaustion  · Rest every day. Try to nap when your baby naps. · Ask another adult to be with you for a few days after delivery. · Plan for  if you have other children. · Stay flexible so you can eat at odd hours and sleep when you need to. Both you and your baby are making new schedules. · Plan small trips to get out of the house. Change can make you feel less tired. · Ask for help with housework, cooking, and shopping. Remind yourself that your job is to care for your baby. Know about help for postpartum depression  · \"Baby blues\" are common for the first 1 to 2 weeks after birth. You may cry or feel sad or irritable for no reason. · Rest whenever you can. Being tired makes it harder to handle your emotions. · Go for walks with your baby. · Talk to your partner, friends, and family about your feelings. · If your symptoms last for more than a few weeks, or if you feel very depressed, ask your doctor for help. · Postpartum depression can be treated. Support groups and counseling can help. Sometimes medicine can also help. Stay healthy  · Eat healthy foods so you have more energy. · If you breastfeed, avoid drugs. If you quit smoking during pregnancy, try to stay smoke-free. If you choose to have a drink now and then, have only one drink, and limit the number of occasions that you have a drink.  Wait to breastfeed at least 2 hours after you have a drink to reduce the amount of alcohol the baby may get in the milk. · Start daily exercise after 4 to 6 weeks, but rest when you feel tired. · Learn exercises to tone your belly. Do Kegel exercises to regain strength in your pelvic muscles. You can do these exercises while you stand or sit. ? Squeeze the same muscles you would use to stop your urine. Your belly and thighs should not move. ? Hold the squeeze for 3 seconds, and then relax for 3 seconds. ? Start with 3 seconds. Then add 1 second each week until you are able to squeeze for 10 seconds. ? Repeat the exercise 10 to 15 times for each session. Do three or more sessions each day. · Find a class for you and your baby that has an exercise time. · If you had a  birth, give yourself a bit more time before you exercise, and be careful. When should you call for help? Share this information with your partner, family, or a friend. They can help you watch for warning signs. Call 911  anytime you think you may need emergency care. For example, call if:    · You have thoughts of harming yourself, your baby, or another person.     · You passed out (lost consciousness).     · You have chest pain, are short of breath, or cough up blood.     · You have a seizure. Call your doctor now or seek immediate medical care if:    · You have signs of hemorrhage (too much bleeding), such as:  ? Heavy vaginal bleeding. This means that you are soaking through one or more pads in an hour. Or you pass blood clots bigger than an egg. ? Feeling dizzy or lightheaded, or you feel like you may faint. ? Feeling so tired or weak that you cannot do your usual activities. ? A fast or irregular heartbeat. ? New or worse belly pain.     · You have signs of infection, such as:  ? A fever. ?  Vaginal discharge that smells bad.  ? New or worse belly pain.     · You have symptoms of a blood clot in your leg (called a deep vein thrombosis), such as:  ? Pain in the calf, back of the knee, thigh, or groin. ? Redness and swelling in your leg or groin.     · You have signs of preeclampsia, such as:  ? Sudden swelling of your face, hands, or feet. ? New vision problems (such as dimness, blurring, or seeing spots). ? A severe headache. Watch closely for changes in your health, and be sure to contact your doctor if:    · Your vaginal bleeding isn't decreasing.     · You feel sad, anxious, or hopeless for more than a few days.     · You are having problems with your breasts or breastfeeding. Where can you learn more? Go to http://www.nunes.com/  Enter A461 in the search box to learn more about \"After Your Delivery (the Postpartum Period): Care Instructions. \"  Current as of: June 16, 2021               Content Version: 13.2  © 2309-2605 [employer]. Care instructions adapted under license by [employer] (which disclaims liability or warranty for this information). If you have questions about a medical condition or this instruction, always ask your healthcare professional. [place] [address] any warranty or liability for your use of this information.

## 2022-04-11 PROBLEM — Z34.90 ENCOUNTER FOR ELECTIVE INDUCTION OF LABOR: Status: ACTIVE | Noted: 2022-04-07

## 2023-06-07 ENCOUNTER — TELEPHONE (OUTPATIENT)
Dept: OBGYN CLINIC | Age: 23
End: 2023-06-07

## 2023-06-07 NOTE — TELEPHONE ENCOUNTER
Patient is wanting to get her tubes tied but was told we do not do that, patient is requesting a referral be sent to [facility] on [address], states the office requires documentation stating that we are unable to perform that procedure before they can schedule her. Patient is also requesting a referral to [address] [place] [address] since the other office has a long wait list. Pls adv. I did not advise the patient that Dr. Najma Timmons does tubals as I am unsure who she spoke with that informed her we do not do them, and the patient does not want to wait to long so unsure if she would be willing to wait for Dr. Najma Timmons to come back from Maternity leave.